# Patient Record
Sex: FEMALE | Race: BLACK OR AFRICAN AMERICAN | Employment: FULL TIME | URBAN - METROPOLITAN AREA
[De-identification: names, ages, dates, MRNs, and addresses within clinical notes are randomized per-mention and may not be internally consistent; named-entity substitution may affect disease eponyms.]

---

## 2020-06-24 ENCOUNTER — HOSPITAL ENCOUNTER (EMERGENCY)
Age: 25
Discharge: HOME | End: 2020-06-24
Attending: EMERGENCY MEDICINE
Payer: MEDICAID

## 2020-06-24 VITALS
WEIGHT: 127.65 LBS | HEIGHT: 64 IN | DIASTOLIC BLOOD PRESSURE: 63 MMHG | RESPIRATION RATE: 16 BRPM | TEMPERATURE: 100.3 F | SYSTOLIC BLOOD PRESSURE: 108 MMHG | HEART RATE: 104 BPM | OXYGEN SATURATION: 100 % | BODY MASS INDEX: 21.79 KG/M2

## 2020-06-24 DIAGNOSIS — J06.9 ACUTE URI: ICD-10-CM

## 2020-06-24 DIAGNOSIS — Z20.822 ENCOUNTER FOR LABORATORY TESTING FOR COVID-19 VIRUS: Primary | ICD-10-CM

## 2020-06-24 PROCEDURE — 99283 EMERGENCY DEPT VISIT LOW MDM: CPT

## 2020-06-24 PROCEDURE — 87635 SARS-COV-2 COVID-19 AMP PRB: CPT

## 2020-06-24 NOTE — LETTER
[address] 
[facility] EMERGENCY DEPT 
[address] [facility] [id number] [phone] Work/School Note Date: 6/24/2020 To Whom It May concern: 
 
Felipe Arzola was seen and treated today in the emergency room by the following provider(s): 
Attending Provider: Noel Curran MD 
Physician Assistant: Ros Moreira. In light of the current COVID-19 pandemic, please excuse your employee from work under the circumstance below: 
 
1) If patient was exposed but without symptoms, he/she should self-isolate at home for 14 days from day of exposure. 2) If patient has symptoms concerning for COVID-19, such as fever, cough, shortness of breath, regardless if patient received testing or not, patient should self-isolate at home until 3 days after symptoms have resolved AND 7 days after symptoms first started, whichever is later. Thank you. Sincerely, Ros Miramontes Sincerely, Ros Miramontes

## 2020-06-24 NOTE — DISCHARGE INSTRUCTIONS
Prevention steps for patients with confirmed or suspected COVID-19 who do not need to be hospitalized    Timing for Self-Isolation    If you have been exposed but do not have symptoms:  If you suspect you have been exposed to someone with COVID-19 and don't have any symptoms, you should self-isolate at home for 14 days from the time of exposure. If you have symptoms whether or not you have been tested: If you have symptoms suggestive of COVID-19, such as fever or cough, regardless of whether you have been tested, you should self-isolate at home until 72 hours (3 days) after your symptoms have completely resolved AND 7 days after your symptoms first started, whichever is later. How to Properly Self-Isolate Due to COVID-19    Stay home except to get medical care  People who are mildly ill with COVID-19 are able to isolate at home during their illness. You should restrict activities outside your home, except for getting medical care. Do not go to work, school, or public areas. Avoid using public transportation, ride-sharing, or taxis. If you or a loved one must go out, please make sure to wash hands properly immediately on returning home. Disinfect touched surfaces. Do not touch your face. Separate yourself from other people and animals in your home  People: As much as possible, you should stay in a specific room and away from other people in your home. Also, you should use a separate bathroom, if available. Animals: You should restrict contact with pets and other animals while you are sick with COVID-19, just like you would around other people. Although there have not been reports of pets or other animals becoming sick with COVID-19, it is still recommended that people sick with COVID-19 limit contact with animals until more information is known about the virus. When possible, have another member of your household care for your animals while you are sick.  If you are sick with COVID-19, avoid contact with your pet, including petting, snuggling, being kissed or licked, and sharing food. If you must care for your pet or be around animals while you are sick, wash your hands before and after you interact with pets and wear a facemask. Call ahead before visiting your doctor  If you have a medical appointment, call the healthcare provider and tell them that you have or may have COVID-19. This will help the healthcare providers office take steps to keep other people from getting infected or exposed. Wear a facemask  You should wear a facemask when you are around other people (e.g., sharing a room or vehicle) or pets and before you enter a healthcare providers office. If you are not able to wear a facemask (for example, because it causes trouble breathing), then people who live with you should not stay in the same room with you, or they should wear a facemask if they enter your room. Cover your coughs and sneezes  Cover your mouth and nose with a tissue when you cough or sneeze. Throw used tissues in a lined trash can. Immediately wash your hands with soap and water for at least 20 seconds or, if soap and water are not available, clean your hands with an alcohol-based hand  that contains at least 60% alcohol. Clean your hands often  Wash your hands often with soap and water for at least 20 seconds, especially after blowing your nose, coughing, or sneezing; going to the bathroom; and before eating or preparing food. If soap and water are not readily available, use an alcohol-based hand  with at least 60% alcohol, covering all surfaces of your hands and rubbing them together until they feel dry. Soap and water are the best option if hands are visibly dirty. Avoid touching your eyes, nose, and mouth with unwashed hands. Avoid sharing personal household items  You should not share dishes, drinking glasses, cups, eating utensils, towels, or bedding with other people or pets in your home.  After using these items, they should be washed thoroughly with soap and water. Clean all high-touch surfaces everyday  High touch surfaces include counters, tabletops, doorknobs, bathroom fixtures, toilets, phones, keyboards, tablets, and bedside tables. Also, clean any surfaces that may have blood, stool, or body fluids on them. Use a household cleaning spray or wipe, according to the label instructions. Labels contain instructions for safe and effective use of the cleaning product including precautions you should take when applying the product, such as wearing gloves and making sure you have good ventilation during use of the product. Monitor your symptoms  Seek prompt medical attention if your illness is worsening (e.g., difficulty breathing). Before seeking care, call your healthcare provider and tell them that you have, or are being evaluated for, COVID-19. Put on a facemask before you enter the facility. These steps will help the healthcare providers office to keep other people in the office or waiting room from getting infected or exposed. Ask your healthcare provider to call the local or Critical access hospital health department. Persons who are placed under active monitoring or facilitated self-monitoring should follow instructions provided by their local health department or occupational health professionals, as appropriate. When working with your local health department check their available hours. If you have a medical emergency and need to call 911, notify the dispatch personnel that you have, or are being evaluated for COVID-19. If possible, put on a facemask before emergency medical services arrive. Discontinuing home isolation  Patients with confirmed COVID-19 should remain under home isolation precautions until the risk of secondary transmission to others is thought to be low based on the above CDC recommendations.  The decision to discontinue home isolation precautions should be made on a case-by-case basis, in consultation with healthcare providers and state and local health departments. Oupatient testing  You can now get tested on an outpatient basis if you are showing symptoms of Covid19 at one of the [facility] or [facility] by appointment only. BETTER MED:  [email or web address] to make an appointment. [facility]IENT FIRST: Luis Alberto to make an appointment. This service is currently offered at the [facility] and [facility]. To make an appointment, call your preferred Center and enter 5 during the recording to speak with the [place]. All [facility] Centers, including the [facility], remain Open Every Day for Walk-in care of Illness and Injury. Who can be tested? In order to make an appointment to be tested, you must meet screening criteria, which are based on CDC guidance. The screening criteria include either of the following conditions:   You have a symptom or symptoms of COVID-19 (fever, coughing, shortness of breath, sore throat).  You are a healthcare worker or . What is the cost?  For insured patients, there is no out-of-pocket expense. The visit will be submitted to patients' insurance. [facility] accepts all major insurance plans, including Medicare and Medicaid. For self-pay patients, the cost is $90 for the exam plus a separate bill from the lab, which is $51.31 in [place]. What is the process for being tested? First, make an appointment by calling your local Designated Patient Corina Cisneros. Please have your insurance card on hand when you call. Bring your insurance card and picture ID with you to your appointment. Upon arrival, follow the Coronavirus Testing signs and park in a designated testing parking spot.  A staff member will meet you at your car to verify your information, complete your registration, check vitals, and take a sample for testing. The sample will be sent to a reference lab for testing. Self-quarantine until you receive your results. A nurse will call you once your results are available, and will provide you with guidance and answer any questions you may have. Further resources and [address]  Please visit the CDC website for more information. RetailCleaners.fi. [place] residents with questions about COVID-19 can call the [address] at [place].

## 2020-06-24 NOTE — ED PROVIDER NOTES
EMERGENCY DEPARTMENT HISTORY AND PHYSICAL EXAM      Date: 6/24/2020  Patient Name: Anirudh Lora    History of Presenting Illness     Chief Complaint   Patient presents with    Covid Testing     HA sore throat, watery eyes, dry cough and general body aches since last night. Pt reports she was drinking alot last night but does not feel like this is from that. History Provided By: Patient    HPI: Anirudh Lora, 22 y.o. female without PMHx significant, presents by POV to the ED with cc of URI symptoms. Her complaint started yesterday. She notes a sore throat, headache, watery eyes, dry cough, myalgias, generalized fatigue. She denies documented fevers but states that she has felt warm. She denies chills, shortness of breath, chest pain, otalgia. She denies being around any known sick contacts. However, she notes that her birthday just occurred and she has been out with friends drinking over the last several days since this weekend. Occupation: Works for a [employer]    Travel: There has been no recent international or domestic travel. There are no other complaints, changes, or physical findings at this time. Social Hx: Tobacco (denies), EtOH (social), Illicit drug use (denies)     PCP: None    No current facility-administered medications on file prior to encounter. Current Outpatient Medications on File Prior to Encounter   Medication Sig Dispense Refill    nitrofurantoin, macrocrystal-monohydrate, (MACROBID) 100 mg capsule Take one by mouth after intercourse 40 Cap 3       Past History     Past Medical History:  Past Medical History:   Diagnosis Date    UTI (urinary tract infection)        Past Surgical History:  No past surgical history on file. Family History:  No family history on file.     Social History:  Social History     Tobacco Use    Smoking status: Never Smoker   Substance Use Topics    Alcohol use: Not on file    Drug use: Not on file Allergies:  No Known Allergies      Review of Systems   Review of Systems   Constitutional: Positive for fatigue and fever. Negative for chills and diaphoresis. HENT: Positive for congestion, rhinorrhea and sore throat. Negative for ear pain. Respiratory: Positive for cough. Negative for shortness of breath. Cardiovascular: Negative for chest pain. Gastrointestinal: Negative for abdominal pain, constipation, diarrhea, nausea and vomiting. Genitourinary: Negative for difficulty urinating, dysuria, frequency and hematuria. Musculoskeletal: Positive for myalgias. Negative for arthralgias. Neurological: Positive for light-headedness and headaches. All other systems reviewed and are negative. Physical Exam   Physical Exam  Vitals signs and nursing note reviewed. Constitutional:       General: She is not in acute distress. Appearance: She is well-developed. She is not diaphoretic. Comments: 22 y. o. -American female    HENT:      Head: Normocephalic and atraumatic. Eyes:      General:         Right eye: No discharge. Left eye: No discharge. Conjunctiva/sclera: Conjunctivae normal.   Neck:      Musculoskeletal: Normal range of motion and neck supple. Cardiovascular:      Rate and Rhythm: Normal rate and regular rhythm. Heart sounds: Normal heart sounds. No murmur. Pulmonary:      Effort: Pulmonary effort is normal. No respiratory distress. Breath sounds: Normal breath sounds. Skin:     General: Skin is warm and dry. Neurological:      Mental Status: She is alert and oriented to person, place, and time. Psychiatric:         Behavior: Behavior normal.         Diagnostic Study Results     Labs - COVID-19 testing pending at discharge. Results will be followed by [address] Radiologic Studies - None    Medical Decision Making   I am the first provider for this patient.     I reviewed the vital signs, available nursing notes, past medical history, past surgical history, family history and social history. Vital Signs-Reviewed the patient's vital signs. Patient Vitals for the past 12 hrs:   Temp Pulse Resp BP SpO2   06/24/20 1657 100.3 °F (37.9 °C) (!) 104 16 108/63 100 %       Records Reviewed: Nursing Notes    Provider Notes (Medical Decision Making): The evaluation, management, and disposition decisions of this patient have been made in the context of the current and rapidly developing COVID-19 pandemic. In my clinical judgment, the balance of clinical factors dictate expedited evaluation and discharge from the ED. I have carefully considered the risk and benefits of prolonged ED workups and/or hospitalization vs their risk of acquiring or transmitting COVID-19. I have made reasonable efforts to conserve healthcare resources and defer to safe outpatient alternatives when feasible. I have also discussed the importance of social distancing and proper hygiene to the patient. Based on an appropriate medical screening exam, there is currently no evidence of an emergency medical condition in the patient, and she is clinically safe for discharge. This was a collective decision made with the patient and/or any available family/caretakers. They expressed understanding and agreement with the above. ED Course:   Initial assessment performed. The patients presenting problems have been discussed, and they are in agreement with the care plan formulated and outlined with them. I have encouraged them to ask questions as they arise throughout their visit. Critical Care Time: None    Disposition:  DISCHARGE NOTE:  6:39 PM  The pt is ready for discharge. The pt's signs, symptoms, diagnosis, and discharge instructions have been discussed and pt has conveyed their understanding. The pt is to follow up as recommended or return to ER should their symptoms worsen. Plan has been discussed and pt is in agreement. PLAN:  1.    Current Discharge Medication List        2. Follow-up Information     Follow up With Specialties Details Why Contact Info    [facility] EMERGENCY DEPT Emergency Medicine  If symptoms worsen [address]  [address] [facility]  [phone]    Your PCP  In 1 week As needed         3. COVID Testing results will be called once available  4. Take Tylenol as needed; Avoid NSAIDs  5. Drink plenty of fluids  6. It is advised to lay prone for 3 hours daily  Return to ED if worse     Diagnosis     Clinical Impression:   1. Encounter for laboratory testing for COVID-19 virus    2. Acute URI          Please note that this dictation was completed with [employer], the [employer] voice recognition software. Quite often unanticipated grammatical, syntax, homophones, and other interpretive errors are inadvertently transcribed by the computer software. Please disregards these errors. Please excuse any errors that have escaped final proofreading. This note will not be viewable in [address]

## 2020-06-25 ENCOUNTER — PATIENT OUTREACH (OUTPATIENT)
Dept: FAMILY MEDICINE CLINIC | Age: 25
End: 2020-06-25

## 2020-06-25 ENCOUNTER — TELEPHONE (OUTPATIENT)
Dept: FAMILY MEDICINE CLINIC | Age: 25
End: 2020-06-25

## 2020-06-25 NOTE — TELEPHONE ENCOUNTER
Left message for patient to call back and scheduled VV due to pending covid results. No in office visit due to symptoms.

## 2020-06-25 NOTE — TELEPHONE ENCOUNTER
----- Message from Jessica Sosa sent at 6/25/2020 10:45 AM EDT -----  Regarding: Dombereket/telephone  New Pt is requesting a hospital f/up appointment she went to [facility] ER on 6/24/20 for a fever and coughing she was tested for covid19 and she has not gotten the results. Pts number is [phone].

## 2020-06-25 NOTE — TELEPHONE ENCOUNTER
----- Message from Shania Monzon sent at 6/25/2020 10:45 AM EDT -----  Regarding: Juli/telephone  New Pt is requesting a hospital f/up appointment she went [facility] on 6/24/20 for a fever and coughing she was tested for covid19 and she has not gotten the results. Pts number is [phone].

## 2020-06-25 NOTE — PROGRESS NOTES
Patient contacted regarding COVID-19  risk. Discussed COVID-19 related testing which was pending at this time. Test results were pending. Patient informed of results, if available? yes     Care Transition Nurse/ Ambulatory Care Manager contacted the patient by telephone to perform post discharge assessment. Verified name and  with patient as identifiers. Provided introduction to self, and explanation of the CTN/ACM role, and reason for call due to risk factors for infection and/or exposure to COVID-19. Symptoms reviewed with patient who verbalized the following symptoms: fever, fatigue and diarrhea. Due to no new or worsening symptoms encounter was not routed to provider for escalation. Discussed follow-up appointments. If no appointment was previously scheduled, appointment scheduling offered: yes  [facility] follow up appointment(s): No future appointments. scheduled through [facility]  [facility] follow [facility] appointment(s): none at this time     Patient has following risk factors of: none reported at this time. CTN/ACM reviewed discharge instructions, medical action plan and red flags such as increased shortness of breath, increasing fever and signs of decompensation with patient who verbalized understanding. Discussed exposure protocols and quarantine with CDC Guidelines What to do if you are sick with coronavirus disease 2019.  Patient was given an opportunity for questions and concerns. The patient agrees to contact the [facility] exposure line [phone], [facility]  ([phone]) and PCP office for questions related to their healthcare. CTN/ACM provided contact information for future needs. Reviewed and educated patient on any new and changed medications related to discharge diagnosis. Patient/family/caregiver given information for [facility] and agrees to enroll yes  Patient's preferred e-mail:  [email or web address]  Patient's preferred phone number: [phone]  Based on Loop alert triggers, patient will be contacted by nurse care manager for worsening symptoms. Pt will be further monitored by COVID Loop Team based on severity of symptoms and risk factors.  DMB

## 2020-06-26 ENCOUNTER — TELEPHONE (OUTPATIENT)
Dept: FAMILY MEDICINE CLINIC | Age: 25
End: 2020-06-26

## 2020-06-26 LAB
SARS-COV-2, COV2NT: DETECTED
SOURCE, COVRS: ABNORMAL
SPECIMEN SOURCE, FCOV2M: ABNORMAL

## 2020-06-26 NOTE — TELEPHONE ENCOUNTER
----- Message from Ed Segura sent at 6/25/2020  8:13 PM EDT -----  Regarding: Dr Bustos/Telephone  General Message/Vendor Calls    Caller's first and last name:      Reason for call: Pt was seen and tested for symptoms related to COVID-19 (no results yet) at the ED at [facility] on 06/24/20 and was referred to schedule an appt to follow up.  Pt is requesting a face to face new pt appt and not a virtual appt to be seen to be tested if there is any other diagnosis other than COVID-19      Callback required yes/no and why: yes    Best contact number(s): [phone]        Details to clarify the request:      Ed Segura

## 2020-06-26 NOTE — PROGRESS NOTES
The patient was called for notification of a POSITIVE test result for COVID-19. The following information was given to the patient:    The COVID-19 test result was positive  Mild and stable symptoms are managed at home    Treatment of coronavirus does not require an antibiotic  Remain isolated for 14 days since symptoms first appeared AND at least 3 days have passed after recovery   Recovery is defined as resolution of fever without the use of fever-reducing medications with progressive improvement or resolution of other symptoms    Wash hands often with soap and water for at least 20 seconds or alternatively use hand  with at least 60% alcohol content  Cover coughs and sneezes  Wear a mask when around others if possible  Clean all high-touch surfaces every day, such as doorknobs and cellphones  Continually monitor symptoms.  Contact your medical provider if symptoms are worsening, such as difficulty breathing  For more information visit the CDC website: DotProtection.gl

## 2020-06-26 NOTE — TELEPHONE ENCOUNTER
Verified patient with two types of identifiers. Advised patient that due to her symptoms and pending covid results we are not able to bring her into the office. However we can do a VV. Scheduled for a VV.

## 2020-07-01 ENCOUNTER — VIRTUAL VISIT (OUTPATIENT)
Dept: FAMILY MEDICINE CLINIC | Age: 25
End: 2020-07-01

## 2020-07-01 DIAGNOSIS — U07.1 REAL TIME REVERSE TRANSCRIPTASE PCR POSITIVE FOR COVID-19 VIRUS: Primary | ICD-10-CM

## 2020-07-01 DIAGNOSIS — N39.0 RECURRENT UTI (URINARY TRACT INFECTION): ICD-10-CM

## 2020-07-01 RX ORDER — NITROFURANTOIN 25; 75 MG/1; MG/1
CAPSULE ORAL
Qty: 40 CAP | Refills: 3 | Status: SHIPPED | OUTPATIENT
Start: 2020-07-01 | End: 2020-11-02 | Stop reason: ALTCHOICE

## 2020-07-01 NOTE — PROGRESS NOTES
Chief Complaint   Patient presents with    New Patient     was tested positive on 6/24/2020      HPI:  Jolynn Warren is a 22 y.o. female who was seen by synchronous (real-time) audio-video technology on 7/1/2020 for New Patient (was tested positive on 6/24/2020 )    Assessment & Plan:   Diagnoses and all orders for this visit:    1. Real time reverse transcriptase PCR positive for COVID-19 virus    2. Recurrent UTI (urinary tract infection)  -     nitrofurantoin, macrocrystal-monohydrate, (Macrobid) 100 mg capsule; Take one by mouth after intercourse        I spent at least 25 minutes on this visit with this new patient. 712  Subjective:   Jolynn Warren is a 22 y. o. AA female was seen in ER 6/24/20 with c/o sore throat, headache, watery eyes, dry cough, myalgias, generalized fatigue. Covid test is positive. She has been in quarantine since and is advised to stay for total of 14 days. After which we will repeat COVD test.  Today, she says symptoms are resolving. Prior to Admission medications    Medication Sig Start Date End Date Taking? Authorizing Provider   nitrofurantoin, macrocrystal-monohydrate, (MACROBID) 100 mg capsule Take one by mouth after intercourse 5/5/16 7/1/20  Kendy Olsen MD     There is no problem list on file for this patient. Current Outpatient Medications   Medication Sig Dispense Refill    nitrofurantoin, macrocrystal-monohydrate, (Macrobid) 100 mg capsule Take one by mouth after intercourse 40 Cap 3     No Known Allergies  Past Medical History:   Diagnosis Date    UTI (urinary tract infection)      History reviewed. No pertinent surgical history. No family history on file.   Social History     Tobacco Use    Smoking status: Never Smoker    Smokeless tobacco: Never Used   Substance Use Topics    Alcohol use: Yes     Comment: socially     ROS  As per hpi    Objective:     Patient-Reported Vitals 6/30/2020   Patient-Reported Weight 124lbs   Patient-Reported Height 53 3/4 Patient-Reported Pulse 68 beats per minute   Patient-Reported Temperature No fever   Patient-Reported SpO2 N/A   Patient-Reported Peak Flow N/A   Patient-Reported LMP 06/09/2020 - 06/13/2020      General: alert, cooperative, no distress   Mental  status: normal mood, behavior, speech, dress, motor activity, and thought processes, able to follow commands   HENT: NCAT   Neck: no visualized mass   Resp: no respiratory distress   Neuro: no gross deficits   Skin: no discoloration or lesions of concern on visible areas   Psychiatric: normal affect, consistent with stated mood, no evidence of hallucinations     Additional exam findings: We discussed the expected course, resolution and complications of the diagnosis(es) in detail. Medication risks, benefits, costs, interactions, and alternatives were discussed as indicated. I advised her to contact the office if her condition worsens, changes or fails to improve as anticipated. She expressed understanding with the diagnosis(es) and plan. Carlee Garcia, who was evaluated through a patient-initiated, synchronous (real-time) audio-video encounter, and/or her healthcare decision maker, is aware that it is a billable service, with coverage as determined by her insurance carrier. She provided verbal consent to proceed: Yes, and patient identification was verified. It was conducted pursuant to the emergency declaration under the [address] [place] [address] authority and the [employer] General Act. A caregiver was present when appropriate. Ability to conduct physical exam was limited. I was in the office. The patient was at home.       Antony Deleon MD

## 2020-07-02 ENCOUNTER — TELEPHONE (OUTPATIENT)
Dept: FAMILY MEDICINE CLINIC | Age: 25
End: 2020-07-02

## 2020-07-02 NOTE — TELEPHONE ENCOUNTER
----- Message from Noemi Tinajero sent at 7/2/2020  8:56 AM EDT -----  Regarding: DR Bustos/telephone  Contact: [phone]  General Message/Vendor Calls    Caller's first and last name:Randee Perez      Reason for call:Update pharmacy information to [facility] pharmacy at [address]. Phone number [phone]      Callback required yes/no and why:no      Best contact number(s):[phone]      Details to clarify the request:      Noemi Tinajero

## 2020-10-06 ENCOUNTER — TELEPHONE (OUTPATIENT)
Dept: FAMILY MEDICINE CLINIC | Age: 25
End: 2020-10-06

## 2020-10-06 NOTE — TELEPHONE ENCOUNTER
Left message advising her that she can schedule an in office visit so long as she is symptom free for 14 days. She can schedule with PSR for in office visit for the next available appt.

## 2020-10-06 NOTE — TELEPHONE ENCOUNTER
----- Message from Anson Anne sent at 10/6/2020  8:09 AM EDT -----  Regarding: Dr. Leon Adams first and last name: pt  Reason for call: Procedures  Callback required yes/no and why: Yes, question  Best contact number(s): [phone]  Details to clarify the request: pap smear, hpv vaccine, DTaP/tdap/td all need to be done as well as f/ups for COVID and labwork, can she do all of it in one appt?

## 2020-10-22 ENCOUNTER — TELEPHONE (OUTPATIENT)
Dept: FAMILY MEDICINE CLINIC | Age: 25
End: 2020-10-22

## 2020-10-22 ENCOUNTER — VIRTUAL VISIT (OUTPATIENT)
Dept: FAMILY MEDICINE CLINIC | Age: 25
End: 2020-10-22
Payer: MEDICAID

## 2020-10-22 DIAGNOSIS — D50.0 IRON DEFICIENCY ANEMIA DUE TO CHRONIC BLOOD LOSS: ICD-10-CM

## 2020-10-22 DIAGNOSIS — E55.9 VITAMIN D DEFICIENCY: ICD-10-CM

## 2020-10-22 DIAGNOSIS — E78.5 DYSLIPIDEMIA (HIGH LDL; LOW HDL): Primary | ICD-10-CM

## 2020-10-22 DIAGNOSIS — Z13.29 SCREENING FOR THYROID DISORDER: ICD-10-CM

## 2020-10-22 DIAGNOSIS — R35.0 FREQUENCY OF URINATION: ICD-10-CM

## 2020-10-22 DIAGNOSIS — Z79.899 ENCOUNTER FOR LONG-TERM (CURRENT) USE OF MEDICATIONS: ICD-10-CM

## 2020-10-22 DIAGNOSIS — R73.03 BORDERLINE DIABETES MELLITUS: ICD-10-CM

## 2020-10-22 DIAGNOSIS — Z20.822 LAB TEST NEGATIVE FOR COVID-19 VIRUS: ICD-10-CM

## 2020-10-22 PROCEDURE — 99214 OFFICE O/P EST MOD 30 MIN: CPT | Performed by: INTERNAL MEDICINE

## 2020-10-22 NOTE — TELEPHONE ENCOUNTER
----- Message from Sathya Villalpando sent at 10/21/2020  4:50 PM EDT -----  Regarding: DR TILELY \[facility] [place] [facility]\"" / Brandon Roman Message/Vendor Calls    APPT SCHEDULED  for employment commission  documentation completing and review.   Pt is requesting to speak with nurse to discuss getting documents to the office via [phone] required     Best contact number(s): [phone]          Sathya Villalpando

## 2020-10-22 NOTE — PROGRESS NOTES
Chief Complaint   Patient presents with    Follow-up     HPI:  Rakel Cowan is a 22 y.o. female who was seen by synchronous (real-time) audio-video technology on 10/22/2020 for No chief complaint on file. Assessment & Plan:   Diagnoses and all orders for this visit:    1. Dyslipidemia (high LDL; low HDL)  -     LIPID PANEL    2. Vitamin D deficiency  -     VITAMIN D, 25 HYDROXY    3. Frequency of urination  -     URINALYSIS W/ RFLX MICROSCOPIC    4. Screening for thyroid disorder  -     TSH 3RD GENERATION    5. Borderline diabetes mellitus  -     HEMOGLOBIN A1C WITH EAG    6. Encounter for long-term (current) use of medications  -     METABOLIC PANEL, COMPREHENSIVE    7. Iron deficiency anemia due to chronic blood loss  -     CBC W/O DIFF    8. Lab test negative for COVID-19 virus      I spent at least 20 minutes on this visit with this established patient. 712  Subjective:   Rakel Cowan is a 22 y. o. AA female with vit d def, yslipidemia who is seen for follow up after COVID infection. Her repeat COVID test is negative. She is requesting a clearance letter and also a FMLA form completion for her job. Prior to Admission medications    Medication Sig Start Date End Date Taking? Authorizing Provider   nitrofurantoin, macrocrystal-monohydrate, (Macrobid) 100 mg capsule Take one by mouth after intercourse 7/1/20  Yes Tricia Goetz MD     Current Outpatient Medications   Medication Sig Dispense Refill    nitrofurantoin, macrocrystal-monohydrate, (Macrobid) 100 mg capsule Take one by mouth after intercourse 40 Cap 3     No Known Allergies     Past Medical History:   Diagnosis Date    UTI (urinary tract infection)      No past surgical history on file. No family history on file.   Social History     Tobacco Use    Smoking status: Never Smoker    Smokeless tobacco: Never Used   Substance Use Topics    Alcohol use: Yes     Comment: socially     ROS  As per hpi    Objective:     Patient-Reported Vitals 10/22/2020 Patient-Reported Weight -   Patient-Reported Height -   Patient-Reported Pulse -   Patient-Reported Temperature -   Patient-Reported SpO2 -   Patient-Reported Peak Flow -   Patient-Reported LMP 10/22/2020      General: alert, cooperative, no distress   Mental  status: normal mood, behavior, speech, dress, motor activity, and thought processes, able to follow commands   HENT: NCAT   Neck: no visualized mass   Resp: no respiratory distress   Neuro: no gross deficits   Skin: no discoloration or lesions of concern on visible areas     Additional exam findings: We discussed the expected course, resolution and complications of the diagnosis(es) in detail. Medication risks, benefits, costs, interactions, and alternatives were discussed as indicated. I advised her to contact the office if her condition worsens, changes or fails to improve as anticipated. She expressed understanding with the diagnosis(es) and plan. Nelli Mcleod, who was evaluated through a patient-initiated, synchronous (real-time) audio-video encounter, and/or her healthcare decision maker, is aware that it is a billable service, with coverage as determined by her insurance carrier. She provided verbal consent to proceed: Yes, and patient identification was verified. It was conducted pursuant to the emergency declaration under the [address] [place] [address], [address] [place] [address] authority and the [employer] General Act. A caregiver was present when appropriate. Ability to conduct physical exam was limited. I was in the office. The patient was at home.       Eduardo Mensah MD

## 2020-10-22 NOTE — LETTER
10/23/2020 9:20 AM 
 
Ms. Sarai Lorenzo [address] [address] After Visit Summary Sarai Lorenzo \"Ever\"  : [birth year] OBJ: [id number]  
 10/22/2020  3:40 PM   Ellis Davidalaina at [address] [place] [address] Today's Visit You saw Odalys Freeman MD on . The following issues were addressed:  
0 Vitamin D deficiency 0 Dyslipidemia (high LDL; low HDL) 0 Frequency of urination  
0 Screening for thyroid disorder  
0 Borderline diabetes mellitus  
0 Encounter for long-term (current) use of medications 0 Iron deficiency anemia due to chronic blood loss  
0 Lab test negative for COVID-19 virus Done Today METABOLIC PANEL, COMPREHENSIVE for Encounter for long-term (current) use of medications CBC W/O DIFF for Iron deficiency anemia due to chronic blood loss LIPID PANEL for Dyslipidemia (high LDL; low HDL) HEMOGLOBIN A1C WITH EAG for Borderline diabetes mellitus TSH 3RD GENERATION for Screening for thyroid disorder VITAMIN D, 25 HYDROXY for Vitamin D deficiency URINALYSIS W/ RFLX MICROSCOPIC for Frequency of urination After Visit Summary Instructions  
from Odalys Freeman MD  
  
Return in about 3 months  
 
(around 2021), or if symptoms worsen or fail to improve, for routine follow up. Today's Visit Done Today What's Next What's Next [id number] Any with Odalys Freeman MD  
 3:40 PM  Ellis Lynn at [address] [place] [address] [facility]  
[phone] Reason for Visit Follow-up Current Immunizations Never Reviewed No immunizations on file. Not reviewed this visit Upcoming Health Maintenance     
Full History HPV Age 9Y-34Y (1 - 2-dose series)   Overdue since 2006 DTaP/Tdap/Td series (1 - Tdap)   Overdue since 2016 PAP AKA CERVICAL CYTOLOGY (Every 3 Years)   Overdue since 2016 Flu Vaccine (1)   Overdue since 2020 Dear Adama: Thank you for requesting a [employer] account. Our records indicate that you already have an active [employer] account. You can access your account anytime at [email or web address]  
  
Did you know that you can access your hospital and ER discharge instructions at any time in [employer]? You can also review all of your test results from your hospital stay or ER visit. 
  
Additional Information 
  
If you have questions, please visit the Frequently Asked Questions section of the [employer] website at [email or web address] Remember, [employer] is NOT to be used for urgent needs. For medical emergencies, dial 911. 
  
Now available from your iPhone and Android! Changes to Your Medication List  
 
(suggestion)   Accurate as of October 22, 2020 11:59 PM. If you have any questions, ask your nurse or doctor. CONTINUE taking these medications CONTINUE taking these medications  
nitrofurantoin (macrocrystal-monohydrate) 100 mg capsule Commonly known as: Macrobid  Take one by mouth after intercourse Sincerely, Arley Bedolla MD

## 2020-10-24 LAB
25(OH)D3+25(OH)D2 SERPL-MCNC: 26.7 NG/ML (ref 30–100)
ALBUMIN SERPL-MCNC: 4.3 G/DL (ref 3.9–5)
ALBUMIN/GLOB SERPL: 1.4 {RATIO} (ref 1.2–2.2)
ALP SERPL-CCNC: 62 IU/L (ref 39–117)
ALT SERPL-CCNC: 10 IU/L (ref 0–32)
APPEARANCE UR: CLEAR
AST SERPL-CCNC: 18 IU/L (ref 0–40)
BACTERIA #/AREA URNS HPF: ABNORMAL /[HPF]
BILIRUB SERPL-MCNC: 0.6 MG/DL (ref 0–1.2)
BILIRUB UR QL STRIP: NEGATIVE
BUN SERPL-MCNC: 16 MG/DL (ref 6–20)
BUN/CREAT SERPL: 16 (ref 9–23)
CALCIUM SERPL-MCNC: 9.2 MG/DL (ref 8.7–10.2)
CASTS URNS QL MICRO: ABNORMAL /LPF
CHLORIDE SERPL-SCNC: 102 MMOL/L (ref 96–106)
CHOLEST SERPL-MCNC: 147 MG/DL (ref 100–199)
CO2 SERPL-SCNC: 25 MMOL/L (ref 20–29)
COLOR UR: YELLOW
CREAT SERPL-MCNC: 0.98 MG/DL (ref 0.57–1)
EPI CELLS #/AREA URNS HPF: ABNORMAL /HPF (ref 0–10)
ERYTHROCYTE [DISTWIDTH] IN BLOOD BY AUTOMATED COUNT: 11.9 % (ref 11.7–15.4)
EST. AVERAGE GLUCOSE BLD GHB EST-MCNC: 100 MG/DL
GLOBULIN SER CALC-MCNC: 3 G/DL (ref 1.5–4.5)
GLUCOSE SERPL-MCNC: 78 MG/DL (ref 65–99)
GLUCOSE UR QL: NEGATIVE
HBA1C MFR BLD: 5.1 % (ref 4.8–5.6)
HCT VFR BLD AUTO: 37.1 % (ref 34–46.6)
HDLC SERPL-MCNC: 70 MG/DL
HGB BLD-MCNC: 12.5 G/DL (ref 11.1–15.9)
HGB UR QL STRIP: ABNORMAL
KETONES UR QL STRIP: NEGATIVE
LDLC SERPL CALC-MCNC: 61 MG/DL (ref 0–99)
LEUKOCYTE ESTERASE UR QL STRIP: NEGATIVE
MCH RBC QN AUTO: 29.5 PG (ref 26.6–33)
MCHC RBC AUTO-ENTMCNC: 33.7 G/DL (ref 31.5–35.7)
MCV RBC AUTO: 88 FL (ref 79–97)
MICRO URNS: ABNORMAL
MUCOUS THREADS URNS QL MICRO: PRESENT
NITRITE UR QL STRIP: NEGATIVE
PH UR STRIP: 6 [PH] (ref 5–7.5)
PLATELET # BLD AUTO: 237 X10E3/UL (ref 150–450)
POTASSIUM SERPL-SCNC: 4 MMOL/L (ref 3.5–5.2)
PROT SERPL-MCNC: 7.3 G/DL (ref 6–8.5)
PROT UR QL STRIP: NEGATIVE
RBC # BLD AUTO: 4.24 X10E6/UL (ref 3.77–5.28)
RBC #/AREA URNS HPF: ABNORMAL /HPF (ref 0–2)
SODIUM SERPL-SCNC: 140 MMOL/L (ref 134–144)
SP GR UR: 1.03 (ref 1–1.03)
TRIGL SERPL-MCNC: 88 MG/DL (ref 0–149)
TSH SERPL DL<=0.005 MIU/L-ACNC: 1.51 UIU/ML (ref 0.45–4.5)
UROBILINOGEN UR STRIP-MCNC: 1 MG/DL (ref 0.2–1)
VLDLC SERPL CALC-MCNC: 16 MG/DL (ref 5–40)
WBC # BLD AUTO: 7.6 X10E3/UL (ref 3.4–10.8)
WBC #/AREA URNS HPF: ABNORMAL /HPF (ref 0–5)

## 2020-10-27 NOTE — PROGRESS NOTES
Liver & kidney functions are normal.  CBC is normal  Cholesterol is normal  Vit d is slightly low, start taking vit d supplement  Urine shows 2+ blood

## 2020-11-02 ENCOUNTER — OFFICE VISIT (OUTPATIENT)
Dept: FAMILY MEDICINE CLINIC | Age: 25
End: 2020-11-02
Payer: MEDICAID

## 2020-11-02 ENCOUNTER — HOSPITAL ENCOUNTER (OUTPATIENT)
Dept: LAB | Age: 25
Discharge: HOME | End: 2020-11-02
Payer: MEDICAID

## 2020-11-02 VITALS
RESPIRATION RATE: 16 BRPM | HEART RATE: 76 BPM | BODY MASS INDEX: 22.73 KG/M2 | DIASTOLIC BLOOD PRESSURE: 60 MMHG | WEIGHT: 131.4 LBS | TEMPERATURE: 97.3 F | SYSTOLIC BLOOD PRESSURE: 107 MMHG | OXYGEN SATURATION: 99 %

## 2020-11-02 DIAGNOSIS — Z23 ENCOUNTER FOR IMMUNIZATION: ICD-10-CM

## 2020-11-02 DIAGNOSIS — R21 ACUTE ERUPTION OF SKIN: ICD-10-CM

## 2020-11-02 DIAGNOSIS — Z01.419 ENCOUNTER FOR GYNECOLOGICAL EXAMINATION: ICD-10-CM

## 2020-11-02 DIAGNOSIS — R06.02 SOB (SHORTNESS OF BREATH): ICD-10-CM

## 2020-11-02 DIAGNOSIS — E55.9 VITAMIN D DEFICIENCY: Primary | ICD-10-CM

## 2020-11-02 DIAGNOSIS — L73.8 FOLLICULITIS BARBAE: ICD-10-CM

## 2020-11-02 PROCEDURE — 99214 OFFICE O/P EST MOD 30 MIN: CPT | Performed by: INTERNAL MEDICINE

## 2020-11-02 PROCEDURE — 88175 CYTOPATH C/V AUTO FLUID REDO: CPT

## 2020-11-02 PROCEDURE — 87624 HPV HI-RISK TYP POOLED RSLT: CPT

## 2020-11-02 RX ORDER — ACETAMINOPHEN 500 MG
2000 TABLET ORAL DAILY
Qty: 30 CAP | Refills: 2 | Status: SHIPPED | OUTPATIENT
Start: 2020-11-02 | End: 2020-11-02 | Stop reason: SDUPTHER

## 2020-11-02 RX ORDER — AZITHROMYCIN 250 MG/1
TABLET, FILM COATED ORAL
Qty: 6 TAB | Refills: 0 | Status: SHIPPED | OUTPATIENT
Start: 2020-11-02 | End: 2020-11-02 | Stop reason: SDUPTHER

## 2020-11-02 RX ORDER — ACETAMINOPHEN 500 MG
2000 TABLET ORAL DAILY
Qty: 30 CAP | Refills: 2 | Status: SHIPPED | OUTPATIENT
Start: 2020-11-02 | End: 2022-08-19 | Stop reason: SDUPTHER

## 2020-11-02 RX ORDER — AZITHROMYCIN 250 MG/1
TABLET, FILM COATED ORAL
Qty: 6 TAB | Refills: 0 | Status: SHIPPED | OUTPATIENT
Start: 2020-11-02 | End: 2020-11-07

## 2020-11-02 RX ORDER — ALBUTEROL SULFATE 90 UG/1
1 AEROSOL, METERED RESPIRATORY (INHALATION)
Status: DISCONTINUED | OUTPATIENT
Start: 2020-11-02 | End: 2020-11-03

## 2020-11-02 NOTE — PROGRESS NOTES
Chief Complaint   Patient presents with    Follow-up     HPI:  Steve Pineda is a 22 y. o. AA female with no significant medical history presents for lab review and pap smear. Liver & kidney functions are normal. CBC is normal, Cholesterol is normal, Vit d is slightly low, Urine shows 2+ blood without infection  Vit D is in pharmacy. Diet and exercise are encouraged. Review of Systems  As per hpi    Past Medical History:   Diagnosis Date    UTI (urinary tract infection)      No past surgical history on file. Social History     Socioeconomic History    Marital status: SINGLE     Spouse name: Not on file    Number of children: Not on file    Years of education: Not on file    Highest education level: Not on file   Tobacco Use    Smoking status: Never Smoker    Smokeless tobacco: Never Used   Substance and Sexual Activity    Alcohol use: Yes     Comment: socially    Drug use: Never    Sexual activity: Yes     Partners: Female     Birth control/protection: None     No family history on file.   Current Outpatient Medications   Medication Sig Dispense Refill    nitrofurantoin, macrocrystal-monohydrate, (Macrobid) 100 mg capsule Take one by mouth after intercourse 40 Cap 3     No Known Allergies    Objective:  Visit Vitals  /60 (BP 1 Location: Right arm, BP Patient Position: Sitting)   Pulse 76   Temp 97.3 °F (36.3 °C) (Temporal)   Resp 16   Wt 131 lb 6.4 oz (59.6 kg)   SpO2 99%   BMI 22.73 kg/m²     Physical Exam:   General appearance - alert, well appearing in no distress  Mental status - alert, oriented to person, place, and time  EYE-PERRL, EOMI  Neck - supple, no significant adenopathy   Chest - clear to auscultation, no wheezes, rales or rhonchi  Heart - normal rate, regular rhythm, no murmurs  Abdomen - soft, nontender, nondistended, no organomegaly  Ext-peripheral pulses normal, no pedal edema  Neuro - no focal findings   Pelvic exam: normal external genitalia, vulva, vagina, cervix, uterus and adnexa, PAP: Pap smear done today. Results for orders placed or performed in visit on 02/26/20   METABOLIC PANEL, COMPREHENSIVE   Result Value Ref Range    Glucose 78 65 - 99 mg/dL    BUN 16 6 - 20 mg/dL    Creatinine 0.98 0.57 - 1.00 mg/dL    GFR est non-AA 80 >59 mL/min/1.73    GFR est AA 93 >59 mL/min/1.73    BUN/Creatinine ratio 16 9 - 23    Sodium 140 134 - 144 mmol/L    Potassium 4.0 3.5 - 5.2 mmol/L    Chloride 102 96 - 106 mmol/L    CO2 25 20 - 29 mmol/L    Calcium 9.2 8.7 - 10.2 mg/dL    Protein, total 7.3 6.0 - 8.5 g/dL    Albumin 4.3 3.9 - 5.0 g/dL    GLOBULIN, TOTAL 3.0 1.5 - 4.5 g/dL    A-G Ratio 1.4 1.2 - 2.2    Bilirubin, total 0.6 0.0 - 1.2 mg/dL    Alk.  phosphatase 62 39 - 117 IU/L    AST (SGOT) 18 0 - 40 IU/L    ALT (SGPT) 10 0 - 32 IU/L   CBC W/O DIFF   Result Value Ref Range    WBC 7.6 3.4 - 10.8 x10E3/uL    RBC 4.24 3.77 - 5.28 x10E6/uL    HGB 12.5 11.1 - 15.9 g/dL    HCT 37.1 34.0 - 46.6 %    MCV 88 79 - 97 fL    MCH 29.5 26.6 - 33.0 pg    MCHC 33.7 31.5 - 35.7 g/dL    RDW 11.9 11.7 - 15.4 %    PLATELET 332 197 - 805 x10E3/uL   LIPID PANEL   Result Value Ref Range    Cholesterol, total 147 100 - 199 mg/dL    Triglyceride 88 0 - 149 mg/dL    HDL Cholesterol 70 >39 mg/dL    VLDL Cholesterol Mehdi 16 5 - 40 mg/dL    LDL Chol Calc ([facility]) 61 0 - 99 mg/dL   HEMOGLOBIN A1C WITH EAG   Result Value Ref Range    Hemoglobin A1c 5.1 4.8 - 5.6 %    Estimated average glucose 100 mg/dL   TSH 3RD GENERATION   Result Value Ref Range    TSH 1.510 0.450 - 4.500 uIU/mL   VITAMIN D, 25 HYDROXY   Result Value Ref Range    VITAMIN D, 25-HYDROXY 26.7 (L) 30.0 - 100.0 ng/mL   URINALYSIS W/ RFLX MICROSCOPIC   Result Value Ref Range    Specific Gravity 1.028 1.005 - 1.030    pH (UA) 6.0 5.0 - 7.5    Color Yellow Yellow    Appearance Clear Clear    Leukocyte Esterase Negative Negative    Protein Negative Negative/Trace    Glucose Negative Negative    Ketone Negative Negative    Blood 2+ (A) Negative    Bilirubin Negative Negative    Urobilinogen 1.0 0.2 - 1.0 mg/dL    Nitrites Negative Negative    Microscopic Examination See additional order    MICROSCOPIC EXAMINATION   Result Value Ref Range    WBC 0-5 0 - 5 /hpf    RBC 11-30 (A) 0 - 2 /hpf    Epithelial cells 0-10 0 - 10 /hpf    Casts None seen None seen /lpf    Mucus Present Not Estab. Bacteria Few None seen/Few     Assessment/Plan:  Diagnoses and all orders for this visit:    Vitamin D deficiency  -     Discontinue: cholecalciferol (VITAMIN D3) (2,000 UNITS /50 MCG) cap capsule; Take 2,000 Units by mouth daily. , Normal, Disp-30 Cap,R-2  -     cholecalciferol (VITAMIN D3) (2,000 UNITS /50 MCG) cap capsule; Take 2,000 Units by mouth daily. , Normal, Disp-30 Cap,R-2    Encounter for immunization  -     Cancel: HUMAN PAPILLOMA VIRUS (HPV) VACCINE, TYPES 6, 11, 16, 18 (QUADRIVALENT), 3 DOSE SCHED., IM  -     Cancel: TETANUS, DIPHTHERIA TOXOIDS AND ACELLULAR PERTUSSIS VACCINE (TDAP), IN INDIVIDS. >=7, IM    Encounter for gynecological examination  -     PAP (IMAGE GUIDED) W/REFL HPV ALL PATHOLOGY ([id number]); Future    Folliculitis barbae  -     Discontinue: azithromycin (ZITHROMAX) 250 mg tablet; use as directed, Normal, Disp-6 Tab,R-0  -     azithromycin (ZITHROMAX) 250 mg tablet; use as directed, Normal, Disp-6 Tab,R-0    Acute eruption of skin  -     REFERRAL TO DERMATOLOGY  -     Discontinue: azithromycin (ZITHROMAX) 250 mg tablet; use as directed, Normal, Disp-6 Tab,R-0  -     azithromycin (ZITHROMAX) 250 mg tablet; use as directed, Normal, Disp-6 Tab,R-0    SOB (shortness of breath)  -     albuterol (PROVENTIL HFA, VENTOLIN HFA, PROAIR HFA) inhaler 1 Puff; 1 Puff, Inhalation, EVERY 4 HOURS RESP, First dose on Mon 11/2/20 at 2000, Until DiscontinuedMODE OF DELIVERY: Spacer      Patient Instructions          Folliculitis: Care Instructions  Overview     Folliculitis (say \"jwh-IZV-tfq-LY-tus\") is an inflammation of the pouches (follicles) in the skin where hair grows.  It can occur on any part of the body, but it is most common on the scalp, face, armpits, and groin. Bacteria, such as those found in a hot tub, can cause folliculitis. But folliculitis can also be caused by other organisms, such as fungi or parasites. Folliculitis begins as a red, tender area near a strand of hair. The skin can itch or burn and may drain pus or blood. Sometimes folliculitis can lead to more serious skin infections. Your doctor usually can treat mild folliculitis with an antibiotic cream or ointment. If you have folliculitis on your scalp, you may use a medicated shampoo. Antibiotics you take as pills can treat infections deeper in the skin. Other treatments that may be used include antifungal and antiparasitic medicines. Folliculitis may be caused by ingrown hairs from shaving. One solution is to stop shaving. If that isn't an option, using an electric razor that doesn't shave so close may help. Laser treatment may also be an option. Laser treatment destroys the hair follicle so hair will no longer grow in the treated area. Follow-up care is a key part of your treatment and safety. Be sure to make and go to all appointments, and call your doctor if you are having problems. It's also a good idea to know your test results and keep a list of the medicines you take. How can you care for yourself at home? · Take your medicine exactly as prescribed. If your doctor prescribed antibiotics, take them as directed. Do not stop taking them just because you feel better. You need to take the full course of antibiotics. · To help with itching or pain, put a warm, moist cloth (like a clean washcloth) on the area for 5 to 10 minutes, 3 to 6 times a day. · Do not share your razor, towel, or washcloth. That can spread folliculitis. · If folliculitis is caused by shaving, try to avoid shaving for at least a month. If that isn't an option, use an electric razor that doesn't shave so close.  Or if you need a clean shave, use shaving cream or gel and always shave in the direction that the hair grows. When should you call for help? Call your doctor now or seek immediate medical care if:    · You have symptoms of infection, such as:  ? Increased pain, swelling, warmth, or redness. ? Red streaks leading from the area. ? Pus draining from the area. ? A fever. Watch closely for changes in your health, and be sure to contact your doctor if:    · You do not get better as expected. Where can you learn more? Go to http://www.driscoll.com/  Enter M257 in the search box to learn more about \"Folliculitis: Care Instructions. \"  Current as of: July 2, 2020               Content Version: 12.6  © 2006-2020 [employer]. Care instructions adapted under license by [employer] (which disclaims liability or warranty for this information). If you have questions about a medical condition or this instruction, always ask your healthcare professional. [place] [address] any warranty or liability for your use of this information. Follow-up and Dispositions    · Return in about 6 months (around 5/2/2021), or if symptoms worsen or fail to improve, for routine follow up.

## 2020-11-02 NOTE — PROGRESS NOTES
Chief Complaint   Patient presents with   [address] [place] [address]     1. Have you been to the ER, urgent care clinic since your last visit? Hospitalized since your last visit? Yes When: 6/24 ED [facility] ER for flu like symptoms. Diagnosed witoscar andersenid, tested negative 7/24/20    2. Have you seen or consulted any other health care providers outside of the [address] [place] [address] since your last visit? Include any pap smears or colon screening.  No    Pt wants to discuss skin condition that causes ingrown hairs and scarring, vaginal discharge, and going on Albuterol

## 2020-11-02 NOTE — PATIENT INSTRUCTIONS
Folliculitis: Care Instructions  Overview     Folliculitis (say \"ewn-PLJ-cmw-LY-tus\") is an inflammation of the pouches (follicles) in the skin where hair grows. It can occur on any part of the body, but it is most common on the scalp, face, armpits, and groin. Bacteria, such as those found in a hot tub, can cause folliculitis. But folliculitis can also be caused by other organisms, such as fungi or parasites. Folliculitis begins as a red, tender area near a strand of hair. The skin can itch or burn and may drain pus or blood. Sometimes folliculitis can lead to more serious skin infections. Your doctor usually can treat mild folliculitis with an antibiotic cream or ointment. If you have folliculitis on your scalp, you may use a medicated shampoo. Antibiotics you take as pills can treat infections deeper in the skin. Other treatments that may be used include antifungal and antiparasitic medicines. Folliculitis may be caused by ingrown hairs from shaving. One solution is to stop shaving. If that isn't an option, using an electric razor that doesn't shave so close may help. Laser treatment may also be an option. Laser treatment destroys the hair follicle so hair will no longer grow in the treated area. Follow-up care is a key part of your treatment and safety. Be sure to make and go to all appointments, and call your doctor if you are having problems. It's also a good idea to know your test results and keep a list of the medicines you take. How can you care for yourself at home? · Take your medicine exactly as prescribed. If your doctor prescribed antibiotics, take them as directed. Do not stop taking them just because you feel better. You need to take the full course of antibiotics. · To help with itching or pain, put a warm, moist cloth (like a clean washcloth) on the area for 5 to 10 minutes, 3 to 6 times a day. · Do not share your razor, towel, or washcloth. That can spread folliculitis.   · If folliculitis is caused by shaving, try to avoid shaving for at least a month. If that isn't an option, use an electric razor that doesn't shave so close. Or if you need a clean shave, use shaving cream or gel and always shave in the direction that the hair grows. When should you call for help? Call your doctor now or seek immediate medical care if:    · You have symptoms of infection, such as:  ? Increased pain, swelling, warmth, or redness. ? Red streaks leading from the area. ? Pus draining from the area. ? A fever. Watch closely for changes in your health, and be sure to contact your doctor if:    · You do not get better as expected. Where can you learn more? Go to http://www.driscoll.com/  Enter M257 in the search box to learn more about \"Folliculitis: Care Instructions. \"  Current as of: July 2, 2020               Content Version: 12.6  © 2006-2020 [employer], [facility]. Care instructions adapted under license by [employer] (which disclaims liability or warranty for this information). If you have questions about a medical condition or this instruction, always ask your healthcare professional. [address] any warranty or liability for your use of this information.

## 2020-11-05 RX ORDER — ALBUTEROL SULFATE 90 UG/1
1 AEROSOL, METERED RESPIRATORY (INHALATION)
Qty: 1 INHALER | Refills: 2 | Status: SHIPPED | OUTPATIENT
Start: 2020-11-05 | End: 2020-11-10 | Stop reason: SDUPTHER

## 2020-12-01 DIAGNOSIS — R87.612 LOW GRADE SQUAMOUS INTRAEPITHELIAL LESION ON CYTOLOGIC SMEAR OF CERVIX (LGSIL): Primary | ICD-10-CM

## 2022-05-06 ENCOUNTER — VIRTUAL VISIT (OUTPATIENT)
Dept: FAMILY MEDICINE CLINIC | Age: 27
End: 2022-05-06
Payer: MEDICAID

## 2022-05-06 ENCOUNTER — NURSE TRIAGE (OUTPATIENT)
Dept: OTHER | Facility: CLINIC | Age: 27
End: 2022-05-06

## 2022-05-06 DIAGNOSIS — J20.9 ACUTE BRONCHITIS DUE TO INFECTION: Primary | ICD-10-CM

## 2022-05-06 PROCEDURE — 99213 OFFICE O/P EST LOW 20 MIN: CPT | Performed by: INTERNAL MEDICINE

## 2022-05-06 RX ORDER — PROMETHAZINE HYDROCHLORIDE AND DEXTROMETHORPHAN HYDROBROMIDE 6.25; 15 MG/5ML; MG/5ML
5 SYRUP ORAL
Qty: 118 ML | Refills: 0 | Status: SHIPPED | OUTPATIENT
Start: 2022-05-06 | End: 2022-05-13

## 2022-05-06 RX ORDER — SULFAMETHOXAZOLE AND TRIMETHOPRIM 800; 160 MG/1; MG/1
1 TABLET ORAL 2 TIMES DAILY
Qty: 14 TABLET | Refills: 0 | Status: SHIPPED | OUTPATIENT
Start: 2022-05-06 | End: 2022-05-13

## 2022-05-06 RX ORDER — ALBUTEROL SULFATE 90 UG/1
1 AEROSOL, METERED RESPIRATORY (INHALATION)
Qty: 18 G | Refills: 1 | Status: SHIPPED | OUTPATIENT
Start: 2022-05-06 | End: 2022-08-19 | Stop reason: SDUPTHER

## 2022-05-06 NOTE — PROGRESS NOTES
Chief Complaint   Patient presents with    Cough     taking OTC medication     Cold Symptoms     HPI:  Bennie Sawyer is a 32 y.o. female who was seen by synchronous (real-time) audio-video technology on 5/6/2022 for Cough (taking OTC medication ) and Cold Symptoms    Assessment & Plan:   Diagnoses and all orders for this visit:    1. Acute bronchitis due to infection  -     trimethoprim-sulfamethoxazole (BACTRIM DS, SEPTRA DS) 160-800 mg per tablet; Take 1 Tablet by mouth two (2) times a day for 7 days. -     promethazine-dextromethorphan (PROMETHAZINE-DM) 6.25-15 mg/5 mL syrup; Take 5 mL by mouth every four (4) hours as needed for Cough for up to 7 days. -     albuterol (PROVENTIL HFA, VENTOLIN HFA, PROAIR HFA) 90 mcg/actuation inhaler; Take 1 Puff by inhalation every four (4) hours as needed for Wheezing. I spent at least 20 minutes on this visit with this established patient. 712  Subjective:   Bennie Sawyer is a 32 y.o. AA female with h/o vit D def and asthma is seen for c/o productive cough, wheezing, no fever/chills, no shortness of breath. Symptoms have been present for 1 week. She has not been seen in clinic since 2020. Prior to Admission medications    Medication Sig Start Date End Date Taking? Authorizing Provider   albuterol (PROVENTIL HFA, VENTOLIN HFA, PROAIR HFA) 90 mcg/actuation inhaler Take 1 Puff by inhalation every four (4) hours as needed for Wheezing. 11/10/20  Yes Jason Soria MD   cholecalciferol (VITAMIN D3) (2,000 UNITS /50 MCG) cap capsule Take 2,000 Units by mouth daily. 11/2/20  Yes Jason Soria MD     There are no problems to display for this patient. Current Outpatient Medications   Medication Sig Dispense Refill    albuterol (PROVENTIL HFA, VENTOLIN HFA, PROAIR HFA) 90 mcg/actuation inhaler Take 1 Puff by inhalation every four (4) hours as needed for Wheezing. 1 Inhaler 2    cholecalciferol (VITAMIN D3) (2,000 UNITS /50 MCG) cap capsule Take 2,000 Units by mouth daily. 30 Cap 2     No Known Allergies  Past Medical History:   Diagnosis Date    UTI (urinary tract infection)      History reviewed. No pertinent surgical history. No family history on file. Social History     Tobacco Use    Smoking status: Never Smoker    Smokeless tobacco: Never Used   Substance Use Topics    Alcohol use: Yes     Comment: socially     ROS:  As per hpi    Objective:     Patient-Reported Vitals 10/22/2020   Patient-Reported Weight -   Patient-Reported Height -   Patient-Reported Pulse -   Patient-Reported Temperature -   Patient-Reported SpO2 -   Patient-Reported Peak Flow -   Patient-Reported LMP 10/22/2020      General: alert, cooperative, no distress   Mental  status: normal mood, behavior, speech, dress, motor activity, and thought processes, able to follow commands   HENT: NCAT   Neck: no visualized mass   Resp: no respiratory distress   Neuro: no gross deficits   Skin: no discoloration or lesions of concern on visible areas   Psychiatric: normal affect, consistent with stated mood, no evidence of hallucinations     Additional exam findings: We discussed the expected course, resolution and complications of the diagnosis(es) in detail. Medication risks, benefits, costs, interactions, and alternatives were discussed as indicated. I advised her to contact the office if her condition worsens, changes or fails to improve as anticipated. She expressed understanding with the diagnosis(es) and plan. Jim Whitfield, was evaluated through a synchronous (real-time) audio-video encounter. The patient (or guardian if applicable) is aware that this is a billable service, which includes applicable co-pays. Verbal consent to proceed has been obtained. The visit was conducted pursuant to the emergency declaration under the [address] [place] [address] authority and the [employer] Act.   Patient identification was verified, and a caregiver was present when appropriate. The patient was located at home in a state where the provider was licensed to provide care.     Alessandra Sharif MD

## 2022-05-06 NOTE — TELEPHONE ENCOUNTER
Received call from Sal John at [facility] with Red Flag Complaint. Subjective: Caller states \"difficulty breathing\"     Current Symptoms: Cough and congestion; Difficulty breathing due to congestion; Sore throat; History of asthma and using inhaler  Needs inhaler refill    Onset: 2 weeks ago; worsening    Associated Symptoms: increased wakefulness    Pain Severity: 7/10; sharp; intermittent    Temperature: unknown     What has been tried: multiple OTC treatments and not helping;    LMP: 4/15/2022 Pregnant: No    Recommended disposition: See in Office Today    Care advice provided, patient verbalizes understanding; denies any other questions or concerns; instructed to call back for any new or worsening symptoms. Patient/Caller agrees with recommended disposition; writer provided warm transfer to [employer] at [facility] for appointment scheduling    Attention Provider: Thank you for allowing me to participate in the care of your patient. The patient was connected to triage in response to information provided to the [facility]. Please do not respond through this encounter as the response is not directed to a shared pool.       Reason for Disposition   SEVERE sore throat pain    Protocols used: SORE THROAT-ADULT-OH

## 2022-08-19 ENCOUNTER — OFFICE VISIT (OUTPATIENT)
Dept: FAMILY MEDICINE CLINIC | Age: 27
End: 2022-08-19
Payer: MEDICAID

## 2022-08-19 VITALS
TEMPERATURE: 97.7 F | SYSTOLIC BLOOD PRESSURE: 106 MMHG | WEIGHT: 135.2 LBS | OXYGEN SATURATION: 99 % | RESPIRATION RATE: 17 BRPM | HEIGHT: 63 IN | BODY MASS INDEX: 23.96 KG/M2 | HEART RATE: 64 BPM | DIASTOLIC BLOOD PRESSURE: 73 MMHG

## 2022-08-19 DIAGNOSIS — J45.20 MILD INTERMITTENT ASTHMA, UNSPECIFIED WHETHER COMPLICATED: Primary | ICD-10-CM

## 2022-08-19 DIAGNOSIS — R73.03 BORDERLINE DIABETES MELLITUS: ICD-10-CM

## 2022-08-19 DIAGNOSIS — M54.2 NECK PAIN: ICD-10-CM

## 2022-08-19 DIAGNOSIS — Z13.31 NEGATIVE DEPRESSION SCREENING: ICD-10-CM

## 2022-08-19 DIAGNOSIS — E78.5 DYSLIPIDEMIA (HIGH LDL; LOW HDL): ICD-10-CM

## 2022-08-19 DIAGNOSIS — D50.0 IRON DEFICIENCY ANEMIA DUE TO CHRONIC BLOOD LOSS: ICD-10-CM

## 2022-08-19 DIAGNOSIS — E03.9 HYPOTHYROIDISM, UNSPECIFIED TYPE: ICD-10-CM

## 2022-08-19 DIAGNOSIS — Z11.59 NEED FOR HEPATITIS C SCREENING TEST: ICD-10-CM

## 2022-08-19 DIAGNOSIS — E55.9 VITAMIN D DEFICIENCY: ICD-10-CM

## 2022-08-19 DIAGNOSIS — R35.0 FREQUENCY OF URINATION: ICD-10-CM

## 2022-08-19 PROCEDURE — 99214 OFFICE O/P EST MOD 30 MIN: CPT | Performed by: INTERNAL MEDICINE

## 2022-08-19 RX ORDER — MELOXICAM 15 MG/1
15 TABLET ORAL DAILY
Qty: 20 TABLET | Refills: 0 | Status: SHIPPED | OUTPATIENT
Start: 2022-08-19 | End: 2022-08-24 | Stop reason: SDUPTHER

## 2022-08-19 RX ORDER — METHOCARBAMOL 500 MG/1
500 TABLET, FILM COATED ORAL 4 TIMES DAILY
Qty: 20 TABLET | Refills: 1 | Status: SHIPPED | OUTPATIENT
Start: 2022-08-19 | End: 2022-08-24 | Stop reason: SDUPTHER

## 2022-08-19 RX ORDER — ALBUTEROL SULFATE 90 UG/1
1 AEROSOL, METERED RESPIRATORY (INHALATION)
Qty: 18 G | Refills: 1 | Status: SHIPPED | OUTPATIENT
Start: 2022-08-19

## 2022-08-19 RX ORDER — ACETAMINOPHEN 500 MG
2000 TABLET ORAL DAILY
Qty: 30 CAPSULE | Refills: 2 | Status: SHIPPED | OUTPATIENT
Start: 2022-08-19

## 2022-08-19 NOTE — PROGRESS NOTES
Identified pt with two pt identifiers(name and ). Reviewed record in preparation for visit and have obtained necessary documentation. All patient medications has been reviewed. Chief Complaint   Patient presents with    Neck Pain     Patient stated she has neck pain onset 3  weeks ago no known injury tightness  with some stiffness     Complete Physical     Patient stated she would like reffearl to a dermatologist may dry skin  very itchy         3 most recent PHQ Screens 2022   Little interest or pleasure in doing things Not at all   Feeling down, depressed, irritable, or hopeless Not at all   Total Score PHQ 2 0     Abuse Screening Questionnaire 2022   Do you ever feel afraid of your partner? N   Are you in a relationship with someone who physically or mentally threatens you? N   Is it safe for you to go home? Y       Health Maintenance Due   Topic    Hepatitis C Screening     COVID-19 Vaccine (1)    DTaP/Tdap/Td series (1 - Tdap)    Depression Screen      Health Maintenance Review: Patient reminded of \"due or due soon\" health maintenance. I have asked the patient to contact his/her primary care provider (PCP) for follow-up on his/her health maintenance. Vitals:    22 1430   BP: 106/73   Pulse: 64   Resp: 17   Temp: 97.7 °F (36.5 °C)   TempSrc: Temporal   SpO2: 99%   Weight: 135 lb 3.2 oz (61.3 kg)   Height: 5' 3\" (1.6 m)       Wt Readings from Last 3 Encounters:   22 135 lb 3.2 oz (61.3 kg)   20 131 lb 6.4 oz (59.6 kg)   20 127 lb 10.3 oz (57.9 kg)     Temp Readings from Last 3 Encounters:   22 97.7 °F (36.5 °C) (Temporal)   20 97.3 °F (36.3 °C) (Temporal)   20 100.3 °F (37.9 °C)     BP Readings from Last 3 Encounters:   22 106/73   20 107/60   20 108/63     Pulse Readings from Last 3 Encounters:   22 64   20 76   20 (!) 104       1. \"Have you been to the ER, urgent care clinic since your last visit?   Hospitalized since your last visit? \" No    2. \"Have you seen or consulted any other health care providers outside of the [address] [place] [address] since your last visit? \" No     3. For patients aged 39-70: Has the patient had a colonoscopy / FIT/ Cologuard? No      If the patient is female:    4. For patients aged 41-77: Has the patient had a mammogram within the past 2 years? NA - based on age or sex      11. For patients aged 21-65: Has the patient had a pap smear?  NA - based on age or sex

## 2022-08-19 NOTE — PROGRESS NOTES
Chief Complaint   Patient presents with    Neck Pain     Patient stated she has neck pain onset 3  weeks ago no known injury tightness  with some stiffness     Complete Physical     Patient stated she would like reffearl to a dermatologist may dry skin  very itchy       HPI:  Raghavendra Giles is a 32 y.o. female presents with 3 week complaints of neck pain associated with tightness with some stiffness. No known injury. Patient is also due for complete physical. Patient is also requesting for a referral to a dermatologist for dry skin, itch skin. .  Review of Systems  As per hpi    Past Medical History:   Diagnosis Date    Asthma     UTI (urinary tract infection)     Vitamin D deficiency      No past surgical history on file. Social History     Socioeconomic History    Marital status: SINGLE   Tobacco Use    Smoking status: Never    Smokeless tobacco: Never   Substance and Sexual Activity    Alcohol use: Yes     Comment: socially    Drug use: Never    Sexual activity: Yes     Partners: Female     Birth control/protection: None     No family history on file. Current Outpatient Medications   Medication Sig Dispense Refill    albuterol (PROVENTIL HFA, VENTOLIN HFA, PROAIR HFA) 90 mcg/actuation inhaler Take 1 Puff by inhalation every four (4) hours as needed for Wheezing. 18 g 1    cholecalciferol (VITAMIN D3) (2,000 UNITS /50 MCG) cap capsule Take 1 Capsule by mouth daily.  30 Capsule 2     No Known Allergies    Objective:  Visit Vitals  /73 (BP 1 Location: Left upper arm, BP Patient Position: Sitting, BP Cuff Size: Adult)   Pulse 64   Temp 97.7 °F (36.5 °C) (Temporal)   Resp 17   Ht 5' 3\" (1.6 m)   Wt 135 lb 3.2 oz (61.3 kg)   SpO2 99%   BMI 23.95 kg/m²     Physical Exam:   General appearance - alert, well appearing in no distress  Mental status - alert, oriented to person, place, and time  EYE-PERRL, EOMI  ENT-ENT exam normal, no neck nodes or sinus tenderness  Neck - supple, no significant adenopathy   Chest - clear to auscultation, no wheezes, rales or rhonchi  Heart - normal rate, regular rhythm, no murmurs  Abdomen - soft, nontender, nondistended, no organomegaly  Ext-peripheral pulses normal, no pedal edema  Neuro -, no focal findings   Neck- normal C-spine, no tenderness, full ROM without pain    Results for orders placed or performed in visit on 36/14/24   METABOLIC PANEL, COMPREHENSIVE   Result Value Ref Range    Glucose 78 65 - 99 mg/dL    BUN 16 6 - 20 mg/dL    Creatinine 0.98 0.57 - 1.00 mg/dL    GFR est non-AA 80 >59 mL/min/1.73    GFR est AA 93 >59 mL/min/1.73    BUN/Creatinine ratio 16 9 - 23    Sodium 140 134 - 144 mmol/L    Potassium 4.0 3.5 - 5.2 mmol/L    Chloride 102 96 - 106 mmol/L    CO2 25 20 - 29 mmol/L    Calcium 9.2 8.7 - 10.2 mg/dL    Protein, total 7.3 6.0 - 8.5 g/dL    Albumin 4.3 3.9 - 5.0 g/dL    GLOBULIN, TOTAL 3.0 1.5 - 4.5 g/dL    A-G Ratio 1.4 1.2 - 2.2    Bilirubin, total 0.6 0.0 - 1.2 mg/dL    Alk.  phosphatase 62 39 - 117 IU/L    AST (SGOT) 18 0 - 40 IU/L    ALT (SGPT) 10 0 - 32 IU/L   CBC W/O DIFF   Result Value Ref Range    WBC 7.6 3.4 - 10.8 x10E3/uL    RBC 4.24 3.77 - 5.28 x10E6/uL    HGB 12.5 11.1 - 15.9 g/dL    HCT 37.1 34.0 - 46.6 %    MCV 88 79 - 97 fL    MCH 29.5 26.6 - 33.0 pg    MCHC 33.7 31.5 - 35.7 g/dL    RDW 11.9 11.7 - 15.4 %    PLATELET 791 865 - 004 x10E3/uL   LIPID PANEL   Result Value Ref Range    Cholesterol, total 147 100 - 199 mg/dL    Triglyceride 88 0 - 149 mg/dL    HDL Cholesterol 70 >39 mg/dL    VLDL, calculated 16 5 - 40 mg/dL    LDL, calculated 61 0 - 99 mg/dL   HEMOGLOBIN A1C WITH EAG   Result Value Ref Range    Hemoglobin A1c 5.1 4.8 - 5.6 %    Estimated average glucose 100 mg/dL   TSH 3RD GENERATION   Result Value Ref Range    TSH 1.510 0.450 - 4.500 uIU/mL   VITAMIN D, 25 HYDROXY   Result Value Ref Range    VITAMIN D, 25-HYDROXY 26.7 (L) 30.0 - 100.0 ng/mL   URINALYSIS W/ RFLX MICROSCOPIC   Result Value Ref Range    Specific Gravity 1.028 1.005 - 1.030 pH (UA) 6.0 5.0 - 7.5    Color Yellow Yellow    Appearance Clear Clear    Leukocyte Esterase Negative Negative    Protein Negative Negative/Trace    Glucose Negative Negative    Ketone Negative Negative    Blood 2+ (A) Negative    Bilirubin Negative Negative    Urobilinogen 1.0 0.2 - 1.0 mg/dL    Nitrites Negative Negative    Microscopic Examination See additional order    MICROSCOPIC EXAMINATION   Result Value Ref Range    WBC 0-5 0 - 5 /hpf    RBC 11-30 (A) 0 - 2 /hpf    Epithelial cells 0-10 0 - 10 /hpf    Casts None seen None seen /lpf    Mucus Present Not Estab. Bacteria Few None seen/Few     Assessment/Plan:  Diagnoses and all orders for this visit:    Mild intermittent asthma, unspecified whether complicated  -     METABOLIC PANEL, COMPREHENSIVE; Future  -     CBC WITH AUTOMATED DIFF; Future  -     albuterol (PROVENTIL HFA, VENTOLIN HFA, PROAIR HFA) 90 mcg/actuation inhaler; Take 1 Puff by inhalation every four (4) hours as needed for Wheezing., Normal, Disp-18 g, R-1    Vitamin D deficiency  -     VITAMIN D, 25 HYDROXY; Future  -     cholecalciferol (VITAMIN D3) (2,000 UNITS /50 MCG) cap capsule; Take 1 Capsule by mouth daily. , Normal, Disp-30 Capsule, R-2    Dyslipidemia (high LDL; low HDL)  -     LIPID PANEL; Future    Frequency of urination  -     URINALYSIS W/ RFLX MICROSCOPIC; Future    Borderline diabetes mellitus  -     HEMOGLOBIN A1C WITH EAG; Future    Iron deficiency anemia due to chronic blood loss  -     CBC WITH AUTOMATED DIFF; Future    Need for hepatitis C screening test  -     HEPATITIS C AB; Future    Hypothyroidism, unspecified type  -     TSH 3RD GENERATION; Future    Negative depression screening    Neck pain  -     methocarbamoL (ROBAXIN) 500 mg tablet; Take 1 Tablet by mouth four (4) times daily. , Normal, Disp-20 Tablet, R-1  -     meloxicam (MOBIC) 15 mg tablet; Take 1 Tablet by mouth daily. , Normal, Disp-20 Tablet, R-0    Neck Pain: Care Instructions  Your Care Instructions  You can have neck pain anywhere from the bottom of your head to the top of your shoulders. It can spread to the upper back or arms. Injuries, painting a ceiling, sleeping with your neck twisted, staying in one position for too long, and many other activities can cause neck pain. Most neck pain gets better with home care. Your doctor may recommend medicine to relieve pain or relax your muscles. He or she may suggest exercise and physical therapy to increase flexibility and relieve stress. You may need to wear a special (cervical) collar to support your neck for a day or two. Follow-up care is a key part of your treatment and safety. Be sure to make and go to all appointments, and call your doctor if you are having problems. It's also a good idea to know your test results and keep a list of the medicines you take. How can you care for yourself at home? Try using a heating pad on a low or medium setting for 15 to 20 minutes every 2 or 3 hours. Try a warm shower in place of one session with the heating pad. You can also try an ice pack for 10 to 15 minutes every 2 to 3 hours. Put a thin cloth between the ice and your skin. Take pain medicines exactly as directed. If the doctor gave you a prescription medicine for pain, take it as prescribed. If you are not taking a prescription pain medicine, ask your doctor if you can take an over-the-counter medicine. If your doctor recommends a cervical collar, wear it exactly as directed. When should you call for help? Call your doctor now or seek immediate medical care if:   You have new or worsening numbness in your arms, buttocks or legs. You have new or worsening weakness in your arms or legs. (This could make it hard to stand up.)    You lose control of your bladder or bowels. Watch closely for changes in your health, and be sure to contact your doctor if:   Your neck pain is getting worse. You are not getting better after 1 week.     You do not get better as expected. Where can you learn more? Go to http://www.gray.com/. Enter [id number] in the search box to learn more about \"Neck Pain: Care Instructions. \"  Current as of: March 21, 2017  Content Version: 11.5  © 6457-6052 [employer]. Care instructions adapted under license by "[employer] (which disclaims liability or warranty for this information). If you have questions about a medical condition or this instruction, always ask your healthcare professional. [place] [address] any warranty or liability for your use of this information. Follow-up and Dispositions    Return 2-3 weeks, for f/u results.

## 2022-08-19 NOTE — TELEPHONE ENCOUNTER
Pt would like Meloxicam and Methocarbamol sent to [facility] on [employer] number to reach her is [phone]

## 2022-08-24 RX ORDER — MELOXICAM 15 MG/1
15 TABLET ORAL DAILY
Qty: 20 TABLET | Refills: 0 | Status: SHIPPED | OUTPATIENT
Start: 2022-08-24 | End: 2022-09-09 | Stop reason: SDUPTHER

## 2022-08-24 RX ORDER — METHOCARBAMOL 500 MG/1
500 TABLET, FILM COATED ORAL 4 TIMES DAILY
Qty: 20 TABLET | Refills: 1 | Status: SHIPPED | OUTPATIENT
Start: 2022-08-24 | End: 2022-09-09 | Stop reason: SDUPTHER

## 2022-09-09 ENCOUNTER — VIRTUAL VISIT (OUTPATIENT)
Dept: FAMILY MEDICINE CLINIC | Age: 27
End: 2022-09-09
Payer: MEDICAID

## 2022-09-09 DIAGNOSIS — L81.9 DISCOLORATION OF SKIN: ICD-10-CM

## 2022-09-09 DIAGNOSIS — N30.00 ACUTE CYSTITIS WITHOUT HEMATURIA: ICD-10-CM

## 2022-09-09 DIAGNOSIS — M54.2 NECK PAIN: Primary | ICD-10-CM

## 2022-09-09 PROCEDURE — 99214 OFFICE O/P EST MOD 30 MIN: CPT | Performed by: INTERNAL MEDICINE

## 2022-09-09 RX ORDER — METHOCARBAMOL 500 MG/1
500 TABLET, FILM COATED ORAL 4 TIMES DAILY
Qty: 20 TABLET | Refills: 1 | Status: SHIPPED | OUTPATIENT
Start: 2022-09-09

## 2022-09-09 RX ORDER — CIPROFLOXACIN 500 MG/1
500 TABLET ORAL 2 TIMES DAILY
Qty: 14 TABLET | Refills: 0 | Status: SHIPPED | OUTPATIENT
Start: 2022-09-09 | End: 2022-09-16

## 2022-09-09 RX ORDER — MELOXICAM 15 MG/1
15 TABLET ORAL DAILY
Qty: 20 TABLET | Refills: 0 | Status: SHIPPED | OUTPATIENT
Start: 2022-09-09

## 2022-09-09 NOTE — PROGRESS NOTES
Chief Complaint   Patient presents with    Muscle Pain     Follow up     Skin Problem     Referral to derm  or topical cream      1. \"Have you been to the ER, urgent care clinic since your last visit? Hospitalized since your last visit? \" No    2. \"Have you seen or consulted any other health care providers outside of the [address] [place] [address] since your last visit? \" No     3. For patients aged 39-70: Has the patient had a colonoscopy / FIT/ Cologuard? No      If the patient is female:    4. For patients aged 41-77: Has the patient had a mammogram within the past 2 years? No      5. For patients aged 21-65: Has the patient had a pap smear?  No

## 2022-09-09 NOTE — PROGRESS NOTES
Chief Complaint   Patient presents with    Muscle Pain     Follow up     Skin Problem     Referral to derm  or topical cream      HPI:  Rubio Sheikh is a 32 y.o. female who was seen by synchronous (real-time) audio-video technology on 9/9/2022 for Muscle Pain (Follow up ) and Skin Problem (Referral to derm  or topical cream )    Assessment & Plan:   Diagnoses and all orders for this visit:    Neck pain  -     meloxicam (MOBIC) 15 mg tablet; Take 1 Tablet by mouth daily. , Normal, Disp-20 Tablet, R-0  -     methocarbamoL (ROBAXIN) 500 mg tablet; Take 1 Tablet by mouth four (4) times daily. , Normal, Disp-20 Tablet, R-1    Discoloration of skin  -     REFERRAL TO DERMATOLOGY    Acute cystitis without hematuria  -     ciprofloxacin HCl (CIPRO) 500 mg tablet; Take 1 Tablet by mouth two (2) times a day for 7 days. , Normal, Disp-14 Tablet, R-0      I spent at least 20 minutes on this visit with this established patient. 712  Subjective:   Rubio Sheikh is a 32 y.o. female is seen for lab review. Results show infection in urine. Otherwise the rest of results are within normal limits. She also has c/o changes in the skin color. She is requesting for a referral to dermatologist.    Prior to Admission medications    Medication Sig Start Date End Date Taking? Authorizing Provider   meloxicam (MOBIC) 15 mg tablet Take 1 Tablet by mouth daily. 8/24/22  Yes Nayla Bustos MD   methocarbamoL (ROBAXIN) 500 mg tablet Take 1 Tablet by mouth four (4) times daily. 8/24/22  Yes Ozzy Orona MD   albuterol (PROVENTIL HFA, VENTOLIN HFA, PROAIR HFA) 90 mcg/actuation inhaler Take 1 Puff by inhalation every four (4) hours as needed for Wheezing. 8/19/22  Yes Ozzy Orona MD   cholecalciferol (VITAMIN D3) (2,000 UNITS /50 MCG) cap capsule Take 1 Capsule by mouth daily. 8/19/22  Yes Ozzy Orona MD     There are no problems to display for this patient.    Current Outpatient Medications   Medication Sig Dispense Refill    meloxicam (MOBIC) 15 mg tablet Take 1 Tablet by mouth daily. 20 Tablet 0    methocarbamoL (ROBAXIN) 500 mg tablet Take 1 Tablet by mouth four (4) times daily. 20 Tablet 1    albuterol (PROVENTIL HFA, VENTOLIN HFA, PROAIR HFA) 90 mcg/actuation inhaler Take 1 Puff by inhalation every four (4) hours as needed for Wheezing. 18 g 1    cholecalciferol (VITAMIN D3) (2,000 UNITS /50 MCG) cap capsule Take 1 Capsule by mouth daily. 30 Capsule 2     No Known Allergies  Past Medical History:   Diagnosis Date    Asthma     UTI (urinary tract infection)     Vitamin D deficiency      No past surgical history on file. No family history on file. Social History     Tobacco Use    Smoking status: Never    Smokeless tobacco: Never   Substance Use Topics    Alcohol use: Yes     Comment: socially     ROS:  As per hpi    Objective:     Patient-Reported Vitals 10/22/2020   Patient-Reported Weight -   Patient-Reported Height -   Patient-Reported Pulse -   Patient-Reported Temperature -   Patient-Reported SpO2 -   Patient-Reported Peak Flow -   Patient-Reported LMP 10/22/2020      General: alert, cooperative, no distress   Mental  status: normal mood, behavior, speech, dress, motor activity, and thought processes, able to follow commands   HENT: NCAT   Neck: no visualized mass   Resp: no respiratory distress   Neuro: no gross deficits   Skin: no discoloration or lesions of concern on visible areas   Psychiatric: normal affect, consistent with stated mood, no evidence of hallucinations     Additional exam findings: We discussed the expected course, resolution and complications of the diagnosis(es) in detail. Medication risks, benefits, costs, interactions, and alternatives were discussed as indicated. I advised her to contact the office if her condition worsens, changes or fails to improve as anticipated. She expressed understanding with the diagnosis(es) and plan. Olga Ayala, was evaluated through a synchronous (real-time) audio-video encounter.  The patient (or guardian if applicable) is aware that this is a billable service, which includes applicable co-pays. This Virtual Visit was conducted with patient's (and/or legal guardian's) consent. The visit was conducted pursuant to the emergency declaration under the [facility], [address] [place] [address] authority and the "[employer] and [email or web address] General Act. Patient identification was verified, and a caregiver was present when appropriate. The patient was located at: Home: [address] [place] [address] [id number] [phone]  The provider was located at:  Facility (Appt Department): [address] [facility] [address]  [address] [facility]        Will Stephenson MD

## 2022-09-16 LAB
25(OH)D3+25(OH)D2 SERPL-MCNC: 24.4 NG/ML (ref 30–100)
ALBUMIN SERPL-MCNC: 4.7 G/DL (ref 3.9–5)
ALBUMIN/GLOB SERPL: 1.6 {RATIO} (ref 1.2–2.2)
ALP SERPL-CCNC: 73 IU/L (ref 44–121)
ALT SERPL-CCNC: 10 IU/L (ref 0–32)
APPEARANCE UR: ABNORMAL
AST SERPL-CCNC: 23 IU/L (ref 0–40)
BACTERIA #/AREA URNS HPF: ABNORMAL /[HPF]
BASOPHILS # BLD AUTO: 0 X10E3/UL (ref 0–0.2)
BASOPHILS NFR BLD AUTO: 1 %
BILIRUB SERPL-MCNC: 1.4 MG/DL (ref 0–1.2)
BILIRUB UR QL STRIP: NEGATIVE
BUN SERPL-MCNC: 12 MG/DL (ref 6–20)
BUN/CREAT SERPL: 15 (ref 9–23)
CALCIUM SERPL-MCNC: 9.7 MG/DL (ref 8.7–10.2)
CASTS URNS QL MICRO: ABNORMAL /LPF
CHLORIDE SERPL-SCNC: 100 MMOL/L (ref 96–106)
CHOLEST SERPL-MCNC: 159 MG/DL (ref 100–199)
CO2 SERPL-SCNC: 25 MMOL/L (ref 20–29)
COLOR UR: YELLOW
CREAT SERPL-MCNC: 0.79 MG/DL (ref 0.57–1)
EGFR: 105 ML/MIN/1.73
EOSINOPHIL # BLD AUTO: 0.1 X10E3/UL (ref 0–0.4)
EOSINOPHIL NFR BLD AUTO: 1 %
EPI CELLS #/AREA URNS HPF: >10 /HPF (ref 0–10)
ERYTHROCYTE [DISTWIDTH] IN BLOOD BY AUTOMATED COUNT: 12.9 % (ref 11.7–15.4)
EST. AVERAGE GLUCOSE BLD GHB EST-MCNC: 105 MG/DL
GLOBULIN SER CALC-MCNC: 2.9 G/DL (ref 1.5–4.5)
GLUCOSE SERPL-MCNC: 47 MG/DL (ref 65–99)
GLUCOSE UR QL STRIP: NEGATIVE
HBA1C MFR BLD: 5.3 % (ref 4.8–5.6)
HCT VFR BLD AUTO: 41.3 % (ref 34–46.6)
HCV AB S/CO SERPL IA: <0.1 S/CO RATIO (ref 0–0.9)
HDLC SERPL-MCNC: 64 MG/DL
HGB BLD-MCNC: 13.6 G/DL (ref 11.1–15.9)
HGB UR QL STRIP: NEGATIVE
IMM GRANULOCYTES # BLD AUTO: 0 X10E3/UL (ref 0–0.1)
IMM GRANULOCYTES NFR BLD AUTO: 0 %
KETONES UR QL STRIP: NEGATIVE
LDLC SERPL CALC-MCNC: 81 MG/DL (ref 0–99)
LEUKOCYTE ESTERASE UR QL STRIP: ABNORMAL
LYMPHOCYTES # BLD AUTO: 2.6 X10E3/UL (ref 0.7–3.1)
LYMPHOCYTES NFR BLD AUTO: 44 %
MCH RBC QN AUTO: 28.5 PG (ref 26.6–33)
MCHC RBC AUTO-ENTMCNC: 32.9 G/DL (ref 31.5–35.7)
MCV RBC AUTO: 86 FL (ref 79–97)
MICRO URNS: ABNORMAL
MONOCYTES # BLD AUTO: 0.5 X10E3/UL (ref 0.1–0.9)
MONOCYTES NFR BLD AUTO: 9 %
NEUTROPHILS # BLD AUTO: 2.8 X10E3/UL (ref 1.4–7)
NEUTROPHILS NFR BLD AUTO: 45 %
NITRITE UR QL STRIP: NEGATIVE
PH UR STRIP: 5.5 [PH] (ref 5–7.5)
PLATELET # BLD AUTO: 277 X10E3/UL (ref 150–450)
POTASSIUM SERPL-SCNC: 4.1 MMOL/L (ref 3.5–5.2)
PROT SERPL-MCNC: 7.6 G/DL (ref 6–8.5)
PROT UR QL STRIP: NEGATIVE
RBC # BLD AUTO: 4.78 X10E6/UL (ref 3.77–5.28)
RBC #/AREA URNS HPF: ABNORMAL /HPF (ref 0–2)
SODIUM SERPL-SCNC: 139 MMOL/L (ref 134–144)
SP GR UR STRIP: 1.03 (ref 1–1.03)
TRIGL SERPL-MCNC: 75 MG/DL (ref 0–149)
TSH SERPL DL<=0.005 MIU/L-ACNC: 0.8 UIU/ML (ref 0.45–4.5)
UROBILINOGEN UR STRIP-MCNC: 0.2 MG/DL (ref 0.2–1)
VLDLC SERPL CALC-MCNC: 14 MG/DL (ref 5–40)
WBC # BLD AUTO: 6.1 X10E3/UL (ref 3.4–10.8)
WBC #/AREA URNS HPF: ABNORMAL /HPF (ref 0–5)

## 2022-11-18 ENCOUNTER — OFFICE VISIT (OUTPATIENT)
Dept: FAMILY MEDICINE CLINIC | Age: 27
End: 2022-11-18
Payer: MEDICAID

## 2022-11-18 ENCOUNTER — HOSPITAL ENCOUNTER (OUTPATIENT)
Dept: GENERAL RADIOLOGY | Age: 27
Discharge: HOME | End: 2022-11-18
Payer: MEDICAID

## 2022-11-18 VITALS
HEIGHT: 63 IN | HEART RATE: 71 BPM | TEMPERATURE: 97.7 F | WEIGHT: 139.8 LBS | OXYGEN SATURATION: 100 % | DIASTOLIC BLOOD PRESSURE: 57 MMHG | BODY MASS INDEX: 24.77 KG/M2 | RESPIRATION RATE: 16 BRPM | SYSTOLIC BLOOD PRESSURE: 93 MMHG

## 2022-11-18 DIAGNOSIS — M23.51 RECURRENT RIGHT KNEE INSTABILITY: ICD-10-CM

## 2022-11-18 DIAGNOSIS — S99.922A INJURY OF LEFT GREAT TOE, INITIAL ENCOUNTER: ICD-10-CM

## 2022-11-18 DIAGNOSIS — S99.922A INJURY OF LEFT GREAT TOE, INITIAL ENCOUNTER: Primary | ICD-10-CM

## 2022-11-18 PROCEDURE — 99214 OFFICE O/P EST MOD 30 MIN: CPT | Performed by: INTERNAL MEDICINE

## 2022-11-18 PROCEDURE — 73660 X-RAY EXAM OF TOE(S): CPT

## 2022-11-18 RX ORDER — MELOXICAM 15 MG/1
15 TABLET ORAL DAILY
Qty: 20 TABLET | Refills: 0 | Status: SHIPPED | OUTPATIENT
Start: 2022-11-18

## 2022-11-18 NOTE — PROGRESS NOTES
Gus Slaughter is a 32 y.o. female    Chief Complaint   Patient presents with    Toe Pain     Left foot big toe 3/10 started 10/28/22  Right Knee twisted/popping out of place since 2016       Visit Vitals  BP (!) 93/57 (BP 1 Location: Left upper arm, BP Patient Position: Sitting, BP Cuff Size: Adult)   Pulse 71   Temp 97.7 °F (36.5 °C) (Temporal)   Resp 16   Ht 5' 3\" (1.6 m)   Wt 139 lb 12.8 oz (63.4 kg)   SpO2 100%   BMI 24.76 kg/m²           1. Have you been to the ER, urgent care clinic since your last visit? Hospitalized since your last visit? NO    2. Have you seen or consulted any other health care providers outside of the [address] [place] [address] since your last visit? Include any pap smears or colon screening.  NO

## 2022-11-18 NOTE — PROGRESS NOTES
Chief Complaint   Patient presents with    Toe Pain     Left foot big toe 3/10 started 10/28/22  Right Knee twisted/popping out of place since 2016     HPI:  Oli Francis is a 32 y.o. female presents with c/o left foot big toe injury following a fall 3/10/22. Also, she has c/o right knee twisting and popping out of place since 2016 as result of injury during basketball game in high school. Since then, symptoms have become progressively worse. Review of Systems  As per hpi    Past Medical History:   Diagnosis Date    Asthma     UTI (urinary tract infection)     Vitamin D deficiency      History reviewed. No pertinent surgical history. Social History     Socioeconomic History    Marital status: SINGLE   Tobacco Use    Smoking status: Never    Smokeless tobacco: Never   Substance and Sexual Activity    Alcohol use: Yes     Comment: socially    Drug use: Never    Sexual activity: Yes     Partners: Female     Birth control/protection: None     History reviewed. No pertinent family history. Current Outpatient Medications   Medication Sig Dispense Refill    meloxicam (MOBIC) 15 mg tablet Take 1 Tablet by mouth daily. 20 Tablet 0    methocarbamoL (ROBAXIN) 500 mg tablet Take 1 Tablet by mouth four (4) times daily. 20 Tablet 1    albuterol (PROVENTIL HFA, VENTOLIN HFA, PROAIR HFA) 90 mcg/actuation inhaler Take 1 Puff by inhalation every four (4) hours as needed for Wheezing. 18 g 1    cholecalciferol (VITAMIN D3) (2,000 UNITS /50 MCG) cap capsule Take 1 Capsule by mouth daily.  30 Capsule 2     No Known Allergies    Objective:  Visit Vitals  BP (!) 93/57 (BP 1 Location: Left upper arm, BP Patient Position: Sitting, BP Cuff Size: Adult)   Pulse 71   Temp 97.7 °F (36.5 °C) (Temporal)   Resp 16   Ht 5' 3\" (1.6 m)   Wt 139 lb 12.8 oz (63.4 kg)   SpO2 100%   BMI 24.76 kg/m²     Physical Exam:   General appearance - alert, well appearing in no distress  Mental status - alert, oriented to person, place, and time  Chest - clear to auscultation, no wheezes, rales or rhonchi  Heart - normal rate, regular rhythm, normal S1, S2, no murmurs, rubs, clicks or gallops   Ext-peripheral pulses normal, no pedal edema, tender left great toe  Neuro - no focal findings  Knee-normal exam, no swelling, tenderness, FROM bilaterally    Results for orders placed or performed in visit on 08/19/22   CBC WITH AUTOMATED DIFF   Result Value Ref Range    WBC 6.1 3.4 - 10.8 x10E3/uL    RBC 4.78 3.77 - 5.28 x10E6/uL    HGB 13.6 11.1 - 15.9 g/dL    HCT 41.3 34.0 - 46.6 %    MCV 86 79 - 97 fL    MCH 28.5 26.6 - 33.0 pg    MCHC 32.9 31.5 - 35.7 g/dL    RDW 12.9 11.7 - 15.4 %    PLATELET 920 884 - 860 x10E3/uL    NEUTROPHILS 45 Not Estab. %    Lymphocytes 44 Not Estab. %    MONOCYTES 9 Not Estab. %    EOSINOPHILS 1 Not Estab. %    BASOPHILS 1 Not Estab. %    ABS. NEUTROPHILS 2.8 1.4 - 7.0 x10E3/uL    Abs Lymphocytes 2.6 0.7 - 3.1 x10E3/uL    ABS. MONOCYTES 0.5 0.1 - 0.9 x10E3/uL    ABS. EOSINOPHILS 0.1 0.0 - 0.4 x10E3/uL    ABS. BASOPHILS 0.0 0.0 - 0.2 x10E3/uL    IMMATURE GRANULOCYTES 0 Not Estab. %    ABS. IMM. GRANS. 0.0 0.0 - 0.1 Y64Y1/   METABOLIC PANEL, COMPREHENSIVE   Result Value Ref Range    Glucose 47 (L) 65 - 99 mg/dL    BUN 12 6 - 20 mg/dL    Creatinine 0.79 0.57 - 1.00 mg/dL    eGFR 105 >59 mL/min/1.73    BUN/Creatinine ratio 15 9 - 23    Sodium 139 134 - 144 mmol/L    Potassium 4.1 3.5 - 5.2 mmol/L    Chloride 100 96 - 106 mmol/L    CO2 25 20 - 29 mmol/L    Calcium 9.7 8.7 - 10.2 mg/dL    Protein, total 7.6 6.0 - 8.5 g/dL    Albumin 4.7 3.9 - 5.0 g/dL    GLOBULIN, TOTAL 2.9 1.5 - 4.5 g/dL    A-G Ratio 1.6 1.2 - 2.2    Bilirubin, total 1.4 (H) 0.0 - 1.2 mg/dL    Alk.  phosphatase 73 44 - 121 IU/L    AST (SGOT) 23 0 - 40 IU/L    ALT (SGPT) 10 0 - 32 IU/L   URINALYSIS W/ RFLX MICROSCOPIC   Result Value Ref Range    Specific Gravity 1.028 1.005 - 1.030    pH (UA) 5.5 5.0 - 7.5    Color Yellow Yellow    Appearance Cloudy (A) Clear    Leukocyte Esterase 1+ (A) Negative    Protein Negative Negative/Trace    Glucose Negative Negative    Ketone Negative Negative    Blood Negative Negative    Bilirubin Negative Negative    Urobilinogen 0.2 0.2 - 1.0 mg/dL    Nitrites Negative Negative    Microscopic Examination See additional order    MICROSCOPIC EXAMINATION   Result Value Ref Range    WBC 11-30 (A) 0 - 5 /hpf    RBC None seen 0 - 2 /hpf    Epithelial cells >10 (A) 0 - 10 /hpf    Casts None seen None seen /lpf    Bacteria Many (A) None seen/Few   LIPID PANEL   Result Value Ref Range    Cholesterol, total 159 100 - 199 mg/dL    Triglyceride 75 0 - 149 mg/dL    HDL Cholesterol 64 >39 mg/dL    VLDL, calculated 14 5 - 40 mg/dL    LDL, calculated 81 0 - 99 mg/dL   HEMOGLOBIN A1C WITH EAG   Result Value Ref Range    Hemoglobin A1c 5.3 4.8 - 5.6 %    Estimated average glucose 105 mg/dL   TSH 3RD GENERATION   Result Value Ref Range    TSH 0.801 0.450 - 4.500 uIU/mL   VITAMIN D, 25 HYDROXY   Result Value Ref Range    VITAMIN D, 25-HYDROXY 24.4 (L) 30.0 - 100.0 ng/mL   HEPATITIS C AB   Result Value Ref Range    Hep C Virus Ab <0.1 0.0 - 0.9 s/co ratio     Assessment/Plan:  Diagnoses and all orders for this visit:    Injury of left great toe, initial encounter  -     meloxicam (MOBIC) 15 mg tablet; Take 1 Tablet by mouth daily. , Normal, Disp-20 Tablet, R-0  -     XR GREAT TOE LT MIN 2 V; Future    Recurrent right knee instability  -     meloxicam (MOBIC) 15 mg tablet; Take 1 Tablet by mouth daily. , Normal, Disp-20 Tablet, R-0  -     REFERRAL TO PHYSICAL THERAPY    Other orders  -     Cancel: REFERRAL TO PHYSICAL THERAPY  -     Cancel: XR GREAT TOE RT MIN 2 V; Future      Follow-up and Dispositions    Return 1-2 weeks, for f/u treatment.

## 2022-11-21 DIAGNOSIS — S92.402A CLOSED NON-PHYSEAL FRACTURE OF PHALANX OF LEFT GREAT TOE, UNSPECIFIED PHALANX, INITIAL ENCOUNTER: Primary | ICD-10-CM

## 2022-12-05 ENCOUNTER — TELEPHONE (OUTPATIENT)
Dept: FAMILY MEDICINE CLINIC | Age: 27
End: 2022-12-05

## 2022-12-05 NOTE — TELEPHONE ENCOUNTER
----- Message from Kelly Caputo sent at 12/5/2022  3:58 PM EST -----  Subject: Results Request    QUESTIONS  Results: x-ray of left big toe second number to call [phone]; Ordered   by: Kale Rios   Date Performed: 2022-11-18  ---------------------------------------------------------------------------  --------------  Karma Rojas YLIT    [phone]; OK to leave message on voicemail  ---------------------------------------------------------------------------  --------------

## 2023-02-07 ENCOUNTER — NURSE TRIAGE (OUTPATIENT)
Dept: OTHER | Facility: CLINIC | Age: 28
End: 2023-02-07
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
## 2023-06-03 ENCOUNTER — HOSPITAL ENCOUNTER (EMERGENCY)
Facility: HOSPITAL | Age: 28
Discharge: HOME | End: 2023-06-04
Attending: EMERGENCY MEDICINE
Payer: MEDICAID

## 2023-06-03 VITALS
HEART RATE: 68 BPM | RESPIRATION RATE: 16 BRPM | TEMPERATURE: 98.3 F | OXYGEN SATURATION: 99 % | DIASTOLIC BLOOD PRESSURE: 79 MMHG | SYSTOLIC BLOOD PRESSURE: 114 MMHG
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
## 2023-06-03 DIAGNOSIS — S16.1XXA CERVICAL STRAIN, ACUTE, INITIAL ENCOUNTER: ICD-10-CM

## 2023-06-03 DIAGNOSIS — M54.2 ACUTE NECK PAIN: Primary | ICD-10-CM

## 2023-06-03 PROCEDURE — 99283 EMERGENCY DEPT VISIT LOW MDM: CPT

## 2023-06-03 ASSESSMENT — PAIN SCALES - GENERAL: PAINLEVEL_OUTOF10: 8

## 2023-06-04 ENCOUNTER — APPOINTMENT (OUTPATIENT)
Facility: HOSPITAL | Age: 28
End: 2023-06-04
Payer: MEDICAID

## 2023-06-04 PROCEDURE — 81025 URINE PREGNANCY TEST: CPT

## 2023-06-04 PROCEDURE — 72050 X-RAY EXAM NECK SPINE 4/5VWS: CPT

## 2023-06-04 RX ORDER — CYCLOBENZAPRINE HCL 10 MG
10 TABLET ORAL 3 TIMES DAILY PRN
Qty: 21 TABLET | Refills: 0 | Status: SHIPPED | OUTPATIENT
Start: 2023-06-04 | End: 2023-06-14

## 2023-06-04 RX ORDER — IBUPROFEN 600 MG/1
600 TABLET ORAL EVERY 8 HOURS PRN
Qty: 20 TABLET | Refills: 0 | Status: SHIPPED | OUTPATIENT
Start: 2023-06-04

## 2023-06-04 ASSESSMENT — ENCOUNTER SYMPTOMS
ABDOMINAL PAIN: 0
BACK PAIN: 0

## 2023-06-04 ASSESSMENT — VISUAL ACUITY: OU: 1

## 2023-06-04 ASSESSMENT — LIFESTYLE VARIABLES
HOW OFTEN DO YOU HAVE A DRINK CONTAINING ALCOHOL: 2-4 TIMES A MONTH
HOW MANY STANDARD DRINKS CONTAINING ALCOHOL DO YOU HAVE ON A TYPICAL DAY: 1 OR 2

## 2023-06-05 LAB — HCG UR QL: NEGATIVE

## 2023-10-23 ENCOUNTER — OFFICE VISIT (OUTPATIENT)
Age: 28
End: 2023-10-23
Payer: MEDICAID

## 2023-10-23 VITALS
OXYGEN SATURATION: 98 % | TEMPERATURE: 97.8 F | SYSTOLIC BLOOD PRESSURE: 113 MMHG | RESPIRATION RATE: 20 BRPM | WEIGHT: 141 LBS | HEART RATE: 67 BPM | HEIGHT: 63 IN | BODY MASS INDEX: 24.98 KG/M2 | DIASTOLIC BLOOD PRESSURE: 78 MMHG

## 2023-10-23 DIAGNOSIS — M23.51 CHRONIC INSTABILITY OF KNEE, RIGHT KNEE: ICD-10-CM

## 2023-10-23 DIAGNOSIS — E55.9 VITAMIN D DEFICIENCY, UNSPECIFIED: ICD-10-CM

## 2023-10-23 DIAGNOSIS — Z11.4 ENCOUNTER FOR SCREENING FOR HUMAN IMMUNODEFICIENCY VIRUS (HIV): ICD-10-CM

## 2023-10-23 DIAGNOSIS — Z00.00 ENCOUNTER FOR ANNUAL PHYSICAL EXAM: Primary | ICD-10-CM

## 2023-10-23 DIAGNOSIS — S16.1XXD NECK STRAIN, SUBSEQUENT ENCOUNTER: ICD-10-CM

## 2023-10-23 DIAGNOSIS — Z12.4 PAP SMEAR FOR CERVICAL CANCER SCREENING: ICD-10-CM

## 2023-10-23 DIAGNOSIS — Z13.1 SCREENING FOR DIABETES MELLITUS (DM): ICD-10-CM

## 2023-10-23 DIAGNOSIS — S46.912D SHOULDER STRAIN, LEFT, SUBSEQUENT ENCOUNTER: ICD-10-CM

## 2023-10-23 DIAGNOSIS — Z13.29 SCREENING FOR THYROID DISORDER: ICD-10-CM

## 2023-10-23 DIAGNOSIS — R35.0 FREQUENCY OF URINATION: ICD-10-CM

## 2023-10-23 DIAGNOSIS — Z13.220 SCREENING CHOLESTEROL LEVEL: ICD-10-CM

## 2023-10-23 PROCEDURE — 99395 PREV VISIT EST AGE 18-39: CPT | Performed by: INTERNAL MEDICINE

## 2023-10-23 RX ORDER — ALBUTEROL SULFATE 90 UG/1
1 AEROSOL, METERED RESPIRATORY (INHALATION) EVERY 4 HOURS PRN
Qty: 18 G | Refills: 1 | Status: SHIPPED | OUTPATIENT
Start: 2023-10-23

## 2023-10-23 RX ORDER — MELOXICAM 7.5 MG/1
7.5 TABLET ORAL DAILY
Qty: 30 TABLET | Refills: 3 | Status: SHIPPED | OUTPATIENT
Start: 2023-10-23

## 2023-10-23 RX ORDER — METHOCARBAMOL 500 MG/1
500 TABLET, FILM COATED ORAL 4 TIMES DAILY
Qty: 40 TABLET | Refills: 0 | Status: SHIPPED | OUTPATIENT
Start: 2023-10-23 | End: 2023-11-02

## 2023-10-23 SDOH — ECONOMIC STABILITY: HOUSING INSECURITY
IN THE LAST 12 MONTHS, WAS THERE A TIME WHEN YOU DID NOT HAVE A STEADY PLACE TO SLEEP OR SLEPT IN A SHELTER (INCLUDING NOW)?: NO

## 2023-10-23 SDOH — ECONOMIC STABILITY: FOOD INSECURITY: WITHIN THE PAST 12 MONTHS, THE FOOD YOU BOUGHT JUST DIDN'T LAST AND YOU DIDN'T HAVE MONEY TO GET MORE.: NEVER TRUE

## 2023-10-23 SDOH — ECONOMIC STABILITY: FOOD INSECURITY: WITHIN THE PAST 12 MONTHS, YOU WORRIED THAT YOUR FOOD WOULD RUN OUT BEFORE YOU GOT MONEY TO BUY MORE.: NEVER TRUE

## 2023-10-23 SDOH — ECONOMIC STABILITY: INCOME INSECURITY: HOW HARD IS IT FOR YOU TO PAY FOR THE VERY BASICS LIKE FOOD, HOUSING, MEDICAL CARE, AND HEATING?: NOT HARD AT ALL

## 2023-10-23 ASSESSMENT — PATIENT HEALTH QUESTIONNAIRE - PHQ9
SUM OF ALL RESPONSES TO PHQ9 QUESTIONS 1 & 2: 0
1. LITTLE INTEREST OR PLEASURE IN DOING THINGS: 0
SUM OF ALL RESPONSES TO PHQ QUESTIONS 1-9: 0
2. FEELING DOWN, DEPRESSED OR HOPELESS: 0
SUM OF ALL RESPONSES TO PHQ QUESTIONS 1-9: 0

## 2023-10-23 ASSESSMENT — ANXIETY QUESTIONNAIRES
7. FEELING AFRAID AS IF SOMETHING AWFUL MIGHT HAPPEN: 0
6. BECOMING EASILY ANNOYED OR IRRITABLE: 0
4. TROUBLE RELAXING: 0
IF YOU CHECKED OFF ANY PROBLEMS ON THIS QUESTIONNAIRE, HOW DIFFICULT HAVE THESE PROBLEMS MADE IT FOR YOU TO DO YOUR WORK, TAKE CARE OF THINGS AT HOME, OR GET ALONG WITH OTHER PEOPLE: NOT DIFFICULT AT ALL
5. BEING SO RESTLESS THAT IT IS HARD TO SIT STILL: 0
1. FEELING NERVOUS, ANXIOUS, OR ON EDGE: 0
3. WORRYING TOO MUCH ABOUT DIFFERENT THINGS: 0
2. NOT BEING ABLE TO STOP OR CONTROL WORRYING: 0
GAD7 TOTAL SCORE: 0

## 2023-10-23 NOTE — TELEPHONE ENCOUNTER
Patient came by the office, she also would like to know if you can prescribe something for her allergies,  eyes and itchy watery, stinging   sometimes her sinus does not feel normal    She forgot to mention the allergy issue to you today      Can you send in prescription for Claritin,  she does not have to pay if you send in rx    [facility]      Patient's phone  [phone]    She also forgot to mention wanting her iron checked,    her mother had low iron and patient has had iron prescription given to her before    she just wants to make sure her iron is ok

## 2023-10-23 NOTE — TELEPHONE ENCOUNTER
Pt wants iron test added to her labs   She is at the [employer] [facility] lab - states they did what was ordered but she talked to Dr Kevin Feliz about   Also, she said they tate extra blood so if you put in an order, they should be able to do it         Also, she wants a referral for allergy testing         Best number to reach her is [phone]

## 2023-10-26 ENCOUNTER — TELEPHONE (OUTPATIENT)
Age: 28
End: 2023-10-26

## 2023-10-27 RX ORDER — LORATADINE PSEUDOEPHEDRINE SULFATE 10; 240 MG/1; MG/1
1 TABLET, EXTENDED RELEASE ORAL DAILY
Qty: 90 TABLET | Refills: 1 | Status: SHIPPED | OUTPATIENT
Start: 2023-10-27

## 2023-11-10 ENCOUNTER — HOSPITAL ENCOUNTER (OUTPATIENT)
Facility: HOSPITAL | Age: 28
Setting detail: RECURRING SERIES
Discharge: HOME | End: 2023-11-13
Payer: MEDICAID

## 2023-11-10 PROCEDURE — 97140 MANUAL THERAPY 1/> REGIONS: CPT | Performed by: PHYSICAL THERAPIST

## 2023-11-10 PROCEDURE — 97162 PT EVAL MOD COMPLEX 30 MIN: CPT | Performed by: PHYSICAL THERAPIST

## 2023-11-10 NOTE — THERAPY EVALUATION
function. Frequency / Duration: Patient to be seen 2 times per week for 16-20 treatments. Patient/ Caregiver education and instruction: Diagnosis, prognosis, self care, activity modification, and exercises   [x]  Plan of care has been reviewed with PTA Lorelei Severs, PT, DPT       11/10/2023       6:56 AM    ===================================================================  I certify that the above Therapy Services are being furnished while the patient is under my care. I agree with the treatment plan and certify that this therapy is necessary. Physician's Signature:_________________________   DATE:_________   TIME:________                           Twila Alston MD    ** Signature, Date and Time must be completed for valid certification **  Please sign and fax to [phone].   Thank you

## 2023-11-15 ENCOUNTER — OFFICE VISIT (OUTPATIENT)
Age: 28
End: 2023-11-15
Payer: MEDICAID

## 2023-11-15 VITALS
DIASTOLIC BLOOD PRESSURE: 60 MMHG | HEART RATE: 66 BPM | HEIGHT: 63 IN | TEMPERATURE: 97.7 F | OXYGEN SATURATION: 98 % | RESPIRATION RATE: 20 BRPM | SYSTOLIC BLOOD PRESSURE: 99 MMHG | WEIGHT: 141 LBS | BODY MASS INDEX: 24.98 KG/M2

## 2023-11-15 DIAGNOSIS — J30.89 ALLERGIC RHINITIS DUE TO OTHER ALLERGIC TRIGGER, UNSPECIFIED SEASONALITY: ICD-10-CM

## 2023-11-15 DIAGNOSIS — E55.9 VITAMIN D DEFICIENCY, UNSPECIFIED: Primary | ICD-10-CM

## 2023-11-15 PROCEDURE — 99213 OFFICE O/P EST LOW 20 MIN: CPT | Performed by: INTERNAL MEDICINE

## 2023-11-15 RX ORDER — LORATADINE 10 MG/1
10 TABLET ORAL DAILY
Qty: 90 TABLET | Refills: 1 | Status: SHIPPED | OUTPATIENT
Start: 2023-11-15

## 2023-11-16 ENCOUNTER — HOSPITAL ENCOUNTER (OUTPATIENT)
Facility: HOSPITAL | Age: 28
Setting detail: RECURRING SERIES
End: 2023-11-16
Payer: MEDICAID

## 2023-11-29 ENCOUNTER — HOSPITAL ENCOUNTER (OUTPATIENT)
Facility: HOSPITAL | Age: 28
Setting detail: RECURRING SERIES
Discharge: HOME | End: 2023-12-02
Payer: MEDICAID

## 2023-11-29 PROCEDURE — 97140 MANUAL THERAPY 1/> REGIONS: CPT | Performed by: PHYSICAL THERAPIST

## 2023-11-29 PROCEDURE — 97110 THERAPEUTIC EXERCISES: CPT | Performed by: PHYSICAL THERAPIST

## 2023-11-29 NOTE — PROGRESS NOTES
PHYSICAL THERAPY - DAILY TREATMENT NOTE (updated 3/23)    Date: 2023        Patient Name:  Monika Omer :  1995   Medical   Diagnosis:  Strain of unspecified muscle, fascia and tendon at shoulder and upper arm level, left arm, subsequent encounter [Q98.329A] Treatment Diagnosis:  M25.561  RIGHT KNEE PAIN , M25.512  LEFT SHOULDER PAIN , and M54.2  NECK PAIN    Referral Source:  Kamila Zamarripa MD Insurance:   Payor: [address] [place] [address] Road / Plan: [address] [place] [address] / Product Type: *No Product type* /                   Patient  verified yes     Visit #   Current  / Total 2 20   Time   In / Out 1444 1555   Total Treatment Time 71   Total Timed Codes 71     SUBJECTIVE    Pain Level (0-10 scale): 4 in neck/L shoulder    Any medication changes, allergies to medications, adverse drug reactions, diagnosis change, or new procedure performed?: [x] No    [] Yes (see summary sheet for update)  Medications: Verified on Patient Summary List    Subjective functional status/changes: The patient reports she felt \"so good\" for several days after initial evaluation; she notes she continues to feel increased neck/L shoulder discomfort with 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 She notes she danced last weekend and her R knee \"went out\" three times over that span; her knee was swollen for about a day or two afterward.     OBJECTIVE    Palpation: Tender to palpation at R > L patellar tendon/infrapatellar fat pad    R Knee AROM 5-0-143 PROM 5-0-150  L Knee AROM 7-0-143 PROM 8-0-155    Joint Mobility Assessment: Noted hypermobility to R > L patellar mobilizations (all planes)    Flexibility: Bilateral LEs WNL, all planes    LOWER QUARTER   MUSCLE STRENGTH  KEY       R  L  0 - No Contraction  Knee ext  4+  4+  1 - Trace            flex  4 (R knee p!)   5  2 - Poor   Hip ext   4  4  3 - Fair          flex   4+  4+ (R trunk lean)  4 - Good

## 2023-12-01 ENCOUNTER — HOSPITAL ENCOUNTER (OUTPATIENT)
Facility: HOSPITAL | Age: 28
Setting detail: RECURRING SERIES
Discharge: HOME | End: 2023-12-04
Payer: MEDICAID

## 2023-12-01 ENCOUNTER — APPOINTMENT (OUTPATIENT)
Facility: HOSPITAL | Age: 28
End: 2023-12-01
Payer: MEDICAID

## 2023-12-01 PROCEDURE — 97140 MANUAL THERAPY 1/> REGIONS: CPT | Performed by: PHYSICAL THERAPIST

## 2023-12-01 PROCEDURE — 97110 THERAPEUTIC EXERCISES: CPT | Performed by: PHYSICAL THERAPIST

## 2023-12-01 NOTE — PROGRESS NOTES
PHYSICAL THERAPY - DAILY TREATMENT NOTE (updated 3/23)    Date: 2023        Patient Name:  Fela Lynch :  1995   Medical   Diagnosis:  Strain of unspecified muscle, fascia and tendon at shoulder and upper arm level, left arm, subsequent encounter [S46.372X] Treatment Diagnosis:  M25.561  RIGHT KNEE PAIN , M25.512  LEFT SHOULDER PAIN , and M54.2  NECK PAIN    Referral Source:  Anna Alvarado MD Insurance:   Payor: [address] [place] [address] Road / Plan: [address] [place] [address] / Product Type: *No Product type* /                   Patient  verified yes     Visit #   Current  / Total 3 20   Time   In / Out 0810 0905   Total Treatment Time 55   Total Timed Codes 55     *Patient arrived 8 minutes late to therapy visit at this date*    SUBJECTIVE    Pain Level (0-10 scale): 5-6 in neck/L shoulder    Any medication changes, allergies to medications, adverse drug reactions, diagnosis change, or new procedure performed?: [x] No    [] Yes (see summary sheet for update)  Medications: Verified on Patient Summary List    Subjective functional status/changes: The patient reports she feels she aggravated her neck symptoms while working on her home exercises last night; she is unsure whether this was due to chin tucks or cervical rotations. She notes she felt good after last visit. She has had no issues related to R knee since last visit. OBJECTIVE    Therapeutic Procedures: Tx Min Billable or 1:1 Min (if diff from Tx Min) Procedure, Rationale, Specifics   40  77658 Therapeutic Exercise (timed):  increase ROM, strength, coordination, balance, and proprioception to improve patient's ability to progress to PLOF and address remaining functional goals.  (see flow sheet as applicable)     Details if applicable:    15  55462 Manual Therapy (timed):  decrease pain, increase ROM, increase tissue extensibility, decrease edema, decrease trigger points, and increase postural awareness to improve 98.5

## 2023-12-04 ENCOUNTER — NURSE TRIAGE (OUTPATIENT)
Dept: OTHER | Facility: CLINIC | Age: 28
End: 2023-12-04

## 2023-12-04 ENCOUNTER — HOSPITAL ENCOUNTER (EMERGENCY)
Facility: HOSPITAL | Age: 28
Discharge: HOME | End: 2023-12-04
Payer: MEDICAID

## 2023-12-04 VITALS
BODY MASS INDEX: 23.86 KG/M2 | SYSTOLIC BLOOD PRESSURE: 102 MMHG | HEIGHT: 64 IN | TEMPERATURE: 98.6 F | DIASTOLIC BLOOD PRESSURE: 79 MMHG | RESPIRATION RATE: 14 BRPM | OXYGEN SATURATION: 100 % | HEART RATE: 61 BPM | WEIGHT: 139.77 LBS

## 2023-12-04 DIAGNOSIS — K13.70 ORAL LESION: Primary | ICD-10-CM

## 2023-12-04 PROCEDURE — 99283 EMERGENCY DEPT VISIT LOW MDM: CPT

## 2023-12-04 RX ORDER — AMOXICILLIN 500 MG/1
500 CAPSULE ORAL 3 TIMES DAILY
Qty: 30 CAPSULE | Refills: 0 | Status: SHIPPED | OUTPATIENT
Start: 2023-12-04 | End: 2023-12-14

## 2023-12-04 ASSESSMENT — PAIN SCALES - GENERAL: PAINLEVEL_OUTOF10: 7

## 2023-12-04 NOTE — TELEPHONE ENCOUNTER
[facility]    Received call from [facility] at [employer] with [employer]. Subjective: Caller states \"Pt currently in the er for swelling and discoloration underneath her tongue. \"     Current Symptoms: swelling underneath tongue    Recommended disposition: Duplicate encounter    Care advice provided, patient verbalizes understanding; denies any other questions or concerns; instructed to call back for any new or worsening symptoms. Patient/Caller agrees with recommended disposition; writer provided warm transfer to [facility] at [employer] for appointment scheduling    Attention Provider: Thank you for allowing me to participate in the care of your patient. The patient was connected to triage in response to information provided to the [facility]. Please do not respond through this encounter as the response is not directed to a shared pool. Reason for Disposition   Caller has already spoken with the PCP and has no further questions.     Protocols used: No Contact or Duplicate Contact Call-ADULT-

## 2023-12-04 NOTE — TELEPHONE ENCOUNTER
Received call from [facility] at [facility] with [employer]. Subjective: Caller states \"I have swelling and discoloration under my tongue\"     Recommended disposition:  No contact call    Care advice provided, patient verbalizes understanding; denies any other questions or concerns; instructed to call back for any new or worsening symptoms. After [facility], [facility], disconnected from the line, caller was disconnected as well. Triage RN tried to call patient back and went to an identified . From encounters in patient chart, she looks to be in [facility] now with arrival time of 1614. LM to call back if still needs assistance. Attention Provider: Thank you for allowing me to participate in the care of your patient. The patient was connected to triage in response to information provided to the E[facility]. Please do not respond through this encounter as the response is not directed to a shared pool.     Reason for Disposition   Message left on identified voice mail    Protocols used: No Contact or Duplicate Contact Call-ADULT-

## 2023-12-05 ENCOUNTER — TELEPHONE (OUTPATIENT)
Age: 28
End: 2023-12-05

## 2023-12-05 NOTE — ED PROVIDER NOTES
[facility] EMERGENCY DEPT  EMERGENCY DEPARTMENT ENCOUNTER       Pt Name: Bimal Ji  MRN: [id number]  [address] 1995  Date of Evaluation: 12/4/2023  Provider: Tatianna Masters PA-C   PCP: Talia Hauser MD  Note Started: 7:41 PM 12/4/23     CHIEF COMPLAINT       Chief Complaint   Patient presents with    Skin Problem     Pt has a redness that is tender up underneath the tongue on left side for a week or more        HISTORY OF PRESENT ILLNESS: 1 or more elements      History From: Patient  None     Bimal Ji is a 29 y.o. female who presents to the ED today with concerns about a red area underneath of the left side of her tongue. This has been present before in the past.  She states usually last for about 24 hours. Thought this may be because she is allergic to elliptical loss. She has not had the area for about a week. She states that it is tender to touch. Comes here for further evaluation     Nursing Notes were all reviewed and agreed with or any disagreements were addressed in the HPI. REVIEW OF SYSTEMS      Review of Systems     Positives and Pertinent negatives as per HPI. PAST HISTORY     Past Medical History:  Past Medical History:   Diagnosis Date    Asthma     UTI (urinary tract infection)     Vitamin D deficiency        Past Surgical History:  No past surgical history on file. Family History:  No family history on file.     Social History:  Social History     Tobacco Use    Smoking status: Never    Smokeless tobacco: Never   Substance Use Topics    Alcohol use: Yes    Drug use: Never       Allergies:  No Known Allergies    CURRENT MEDICATIONS      Previous Medications    ALBUTEROL SULFATE HFA (PROVENTIL;VENTOLIN;PROAIR) 108 (90 BASE) MCG/ACT INHALER    Inhale 1 puff into the lungs every 4 hours as needed for Wheezing    CHOLECALCIFEROL (VITAMIN D3) 125 MCG (5000 UT) CAPS    Take 1 capsule by mouth daily    LORATADINE (CLARITIN) 10 MG TABLET    Take 1 tablet by mouth daily

## 2023-12-05 NOTE — TELEPHONE ENCOUNTER
Adalgisa Langford [facility] 203  Staff  Subject: Appointment Request    Reason for Call: Established Patient Appointment needed: Routine ED Follow  Up Visit    QUESTIONS    Reason for appointment request? No appointments available during search     Additional Information for Provider? Pt currently at [facility] ED for swelling  under her tongue. She wanted to schedule a f/u appt. Nothing is showing  available this week. Please contact pt to schedule.  ---------------------------------------------------------------------------  --------------  CALL BACK INFO  [phone]; OK to leave message on [employer]

## 2023-12-08 ENCOUNTER — APPOINTMENT (OUTPATIENT)
Facility: HOSPITAL | Age: 28
End: 2023-12-08
Payer: MEDICAID

## 2023-12-12 ENCOUNTER — APPOINTMENT (OUTPATIENT)
Facility: HOSPITAL | Age: 28
End: 2023-12-12
Payer: MEDICAID

## 2023-12-13 ENCOUNTER — HOSPITAL ENCOUNTER (OUTPATIENT)
Facility: HOSPITAL | Age: 28
Setting detail: RECURRING SERIES
End: 2023-12-13
Payer: MEDICAID

## 2023-12-15 ENCOUNTER — APPOINTMENT (OUTPATIENT)
Facility: HOSPITAL | Age: 28
End: 2023-12-15
Payer: MEDICAID

## 2023-12-18 ENCOUNTER — APPOINTMENT (OUTPATIENT)
Facility: HOSPITAL | Age: 28
End: 2023-12-18
Payer: MEDICAID

## 2023-12-19 ENCOUNTER — APPOINTMENT (OUTPATIENT)
Facility: HOSPITAL | Age: 28
End: 2023-12-19
Payer: MEDICAID

## 2023-12-22 ENCOUNTER — APPOINTMENT (OUTPATIENT)
Facility: HOSPITAL | Age: 28
End: 2023-12-22
Payer: MEDICAID

## 2023-12-27 ENCOUNTER — APPOINTMENT (OUTPATIENT)
Facility: HOSPITAL | Age: 28
End: 2023-12-27
Payer: MEDICAID

## 2023-12-29 ENCOUNTER — TELEPHONE (OUTPATIENT)
Age: 28
End: 2023-12-29

## 2023-12-29 ENCOUNTER — APPOINTMENT (OUTPATIENT)
Facility: HOSPITAL | Age: 28
End: 2023-12-29
Payer: MEDICAID

## 2023-12-29 NOTE — TELEPHONE ENCOUNTER
Patient came in to office for Referral for Physical Theraphy and Dermatology . Pt of left shoulder, left side of neck and right knee Left side of upper back. Please call at [phone].

## 2024-01-02 DIAGNOSIS — M54.2 NECK PAIN ON LEFT SIDE: ICD-10-CM

## 2024-01-02 DIAGNOSIS — M54.50 LEFT-SIDED LOW BACK PAIN WITHOUT SCIATICA, UNSPECIFIED CHRONICITY: ICD-10-CM

## 2024-01-02 DIAGNOSIS — M25.512 LEFT SHOULDER PAIN, UNSPECIFIED CHRONICITY: Primary | ICD-10-CM

## 2024-04-01 ENCOUNTER — HOSPITAL ENCOUNTER (OUTPATIENT)
Facility: HOSPITAL | Age: 29
Setting detail: RECURRING SERIES
Discharge: HOME | End: 2024-04-04
Payer: MEDICAID

## 2024-04-01 PROCEDURE — 97110 THERAPEUTIC EXERCISES: CPT | Performed by: PHYSICAL THERAPIST

## 2024-04-01 PROCEDURE — 97161 PT EVAL LOW COMPLEX 20 MIN: CPT | Performed by: PHYSICAL THERAPIST

## 2024-04-01 PROCEDURE — 97140 MANUAL THERAPY 1/> REGIONS: CPT | Performed by: PHYSICAL THERAPIST

## 2024-04-01 NOTE — THERAPY EVALUATION
patient will demonstrate multiplanar bilateral LE strength increased by >= 1/2 MMT grade toward improved endurance with functional activities such as walking, squatting, and dancing.              The patient will demonstrate ability to complete >= 25 R single limb heel raises toward improved stability and endurance with walking, squatting, and dancing.              The patient will score >= 78 on shoulder and >= 72 on knee FOTOs to demonstrate significantly improved subjective report of function.    Frequency / Duration: Patient to be seen 1 time per week for 12-16 treatments.    Patient/ Caregiver education and instruction: Diagnosis, prognosis, self care, activity modification, and exercises   [x]  Plan of care has been reviewed with VIOLETTE MARQUEZ PT, DPT       4/1/2024       8:55 AM    ===================================================================  I certify that the above Therapy Services are being furnished while the patient is under my care. I agree with the treatment plan and certify that this therapy is necessary.    Physician's Signature:_________________________   DATE:_________   TIME:________                           Martin Crocker MD    ** Signature, Date and Time must be completed for valid certification **  Please sign and fax to [phone].  Thank you

## 2024-04-18 ENCOUNTER — HOSPITAL ENCOUNTER (OUTPATIENT)
Facility: HOSPITAL | Age: 29
Setting detail: RECURRING SERIES
Discharge: HOME | End: 2024-04-21
Payer: MEDICAID

## 2024-04-18 PROCEDURE — 97110 THERAPEUTIC EXERCISES: CPT

## 2024-04-18 NOTE — PROGRESS NOTES
PHYSICAL THERAPY - DAILY TREATMENT NOTE (updated 3/23)      Date: 2024          Patient Name:  Mary Yusuf :  1995   Medical   Diagnosis:  Left shoulder pain [M25.512]  Neck pain on left side [M54.2]  Right knee pain [M25.561]  Left-sided low back pain without sciatica, unspecified chronicity [M54.50] Treatment Diagnosis:  M25.561  RIGHT KNEE PAIN  and M25.512  LEFT SHOULDER PAIN    Referral Source:  Martin Crocker MD Insurance:   Payor: [facility] / Plan: [employer] MEDICAID [employer] [place] / Product Type: *No Product type* /                     Patient  verified yes     Visit #   Current  / Total 2 16   Time   In / Out 9:10 AM 9:50 AM   Total Treatment Time 40 minutes   Total Timed Codes 40 minutes         SUBJECTIVE    Pain Level (0-10 scale): 4/10    Any medication changes, allergies to medications, adverse drug reactions, diagnosis change, or new procedure performed?: [x] No    [] Yes (see summary sheet for update)  Medications: Verified on Patient Summary List    Subjective functional status/changes:     The Pt reports that she was able to do the exercises at home and it made her R knee a little more achy, but then it felt like it was getting better and stronger.  She continues to complains of medial knee pain, but it is not as intense as it was.    OBJECTIVE      Therapeutic Procedures:  Tx Min Billable or 1:1 Min (if diff from Tx Min) Procedure, Rationale, Specifics   40  81037 Therapeutic Exercise (timed):  increase ROM, strength, coordination, balance, and proprioception to improve patient's ability to progress to PLOF and address remaining functional goals. (see flow sheet as applicable)     Details if applicable:                         40     Total Total         [x]  Patient Education billed concurrently with other procedures   [x] Review HEP    [] Progressed/Changed HEP, detail:    [] Other detail:         Other Objective/Functional Measures  None

## 2024-04-22 ENCOUNTER — HOSPITAL ENCOUNTER (OUTPATIENT)
Facility: HOSPITAL | Age: 29
Discharge: HOME | End: 2024-04-25
Payer: MEDICAID

## 2024-04-22 ENCOUNTER — OFFICE VISIT (OUTPATIENT)
Age: 29
End: 2024-04-22
Payer: MEDICAID

## 2024-04-22 VITALS
HEIGHT: 63 IN | OXYGEN SATURATION: 99 % | TEMPERATURE: 97.5 F | HEART RATE: 73 BPM | SYSTOLIC BLOOD PRESSURE: 106 MMHG | DIASTOLIC BLOOD PRESSURE: 68 MMHG | BODY MASS INDEX: 25.52 KG/M2 | WEIGHT: 144 LBS | RESPIRATION RATE: 18 BRPM

## 2024-04-22 DIAGNOSIS — S46.912D SHOULDER STRAIN, LEFT, SUBSEQUENT ENCOUNTER: ICD-10-CM

## 2024-04-22 DIAGNOSIS — S16.1XXD NECK STRAIN, SUBSEQUENT ENCOUNTER: ICD-10-CM

## 2024-04-22 DIAGNOSIS — G89.29 CHRONIC PAIN OF RIGHT KNEE: ICD-10-CM

## 2024-04-22 DIAGNOSIS — E55.9 VITAMIN D DEFICIENCY, UNSPECIFIED: ICD-10-CM

## 2024-04-22 DIAGNOSIS — J45.20 MILD INTERMITTENT ASTHMA WITHOUT COMPLICATION: Primary | ICD-10-CM

## 2024-04-22 DIAGNOSIS — L30.9 ECZEMA, UNSPECIFIED TYPE: ICD-10-CM

## 2024-04-22 DIAGNOSIS — M23.51 CHRONIC INSTABILITY OF KNEE, RIGHT KNEE: ICD-10-CM

## 2024-04-22 DIAGNOSIS — M25.561 CHRONIC PAIN OF RIGHT KNEE: ICD-10-CM

## 2024-04-22 LAB
25(OH)D3 SERPL-MCNC: 38.4 NG/ML (ref 30–100)
ALBUMIN SERPL-MCNC: 3.6 G/DL (ref 3.5–5)
ALBUMIN/GLOB SERPL: 0.9 (ref 1.1–2.2)
ALP SERPL-CCNC: 61 U/L (ref 45–117)
ALT SERPL-CCNC: 18 U/L (ref 12–78)
ANION GAP SERPL CALC-SCNC: 2 MMOL/L (ref 5–15)
AST SERPL-CCNC: 16 U/L (ref 15–37)
BILIRUB SERPL-MCNC: 0.5 MG/DL (ref 0.2–1)
BUN SERPL-MCNC: 12 MG/DL (ref 6–20)
BUN/CREAT SERPL: 15 (ref 12–20)
CALCIUM SERPL-MCNC: 9.4 MG/DL (ref 8.5–10.1)
CHLORIDE SERPL-SCNC: 107 MMOL/L (ref 97–108)
CO2 SERPL-SCNC: 28 MMOL/L (ref 21–32)
CREAT SERPL-MCNC: 0.82 MG/DL (ref 0.55–1.02)
GLOBULIN SER CALC-MCNC: 3.8 G/DL (ref 2–4)
GLUCOSE SERPL-MCNC: 88 MG/DL (ref 65–100)
POTASSIUM SERPL-SCNC: 4.3 MMOL/L (ref 3.5–5.1)
PROT SERPL-MCNC: 7.4 G/DL (ref 6.4–8.2)
SODIUM SERPL-SCNC: 137 MMOL/L (ref 136–145)

## 2024-04-22 PROCEDURE — 73560 X-RAY EXAM OF KNEE 1 OR 2: CPT

## 2024-04-22 PROCEDURE — 99214 OFFICE O/P EST MOD 30 MIN: CPT | Performed by: INTERNAL MEDICINE

## 2024-04-22 RX ORDER — MELOXICAM 7.5 MG/1
7.5 TABLET ORAL DAILY
Qty: 30 TABLET | Refills: 3 | Status: SHIPPED | OUTPATIENT
Start: 2024-04-22

## 2024-04-22 RX ORDER — ALBUTEROL SULFATE 90 UG/1
1 AEROSOL, METERED RESPIRATORY (INHALATION) EVERY 4 HOURS PRN
Qty: 18 G | Refills: 1 | Status: SHIPPED | OUTPATIENT
Start: 2024-04-22

## 2024-04-22 RX ORDER — LORATADINE PSEUDOEPHEDRINE SULFATE 10; 240 MG/1; MG/1
1 TABLET, EXTENDED RELEASE ORAL DAILY
Qty: 90 TABLET | Refills: 1 | Status: SHIPPED | OUTPATIENT
Start: 2024-04-22

## 2024-04-22 ASSESSMENT — PATIENT HEALTH QUESTIONNAIRE - PHQ9
SUM OF ALL RESPONSES TO PHQ9 QUESTIONS 1 & 2: 0
SUM OF ALL RESPONSES TO PHQ QUESTIONS 1-9: 0
SUM OF ALL RESPONSES TO PHQ QUESTIONS 1-9: 0
2. FEELING DOWN, DEPRESSED OR HOPELESS: NOT AT ALL
SUM OF ALL RESPONSES TO PHQ QUESTIONS 1-9: 0
1. LITTLE INTEREST OR PLEASURE IN DOING THINGS: NOT AT ALL
SUM OF ALL RESPONSES TO PHQ QUESTIONS 1-9: 0

## 2024-04-22 NOTE — PROGRESS NOTES
Mary Yusuf is a 28 y.o. female presenting for Follow-up (Follow up per Vitamin D levels and requesting xray on her knee ), Referral - General (Dermatology referral ), and Medication Refill      /68 (Site: Right Upper Arm, Position: Sitting, Cuff Size: Large Adult)   Pulse 73   Temp 97.5 °F (36.4 °C) (Temporal)   Resp 18   Ht 1.6 m (5' 3\")   Wt 65.3 kg (144 lb)   LMP 04/05/2024   SpO2 99%   BMI 25.51 kg/m²       Current Outpatient Medications   Medication Sig Dispense Refill    benzocaine (ORAJEL) 20 % GEL mucosal gel Take 1 Application by mouth 2 times daily as needed for Pain 1 each 0    Cholecalciferol (VITAMIN D3) 125 MCG (5000 UT) CAPS Take 1 capsule by mouth daily 90 capsule 1    loratadine-pseudoephedrine (CLARITIN-D 24 HOUR)  MG per extended release tablet Take 1 tablet by mouth daily 90 tablet 1    meloxicam (MOBIC) 7.5 MG tablet Take 1 tablet by mouth daily 30 tablet 3    albuterol sulfate HFA (PROVENTIL;VENTOLIN;PROAIR) 108 (90 Base) MCG/ACT inhaler Inhale 1 puff into the lungs every 4 hours as needed for Wheezing 18 g 1     No current facility-administered medications for this visit.        1. \"Have you been to the ER, urgent care clinic since your last visit?  Hospitalized since your last visit?\" No    2. \"Have you seen or consulted any other health care providers outside of the [facility] since your last visit?\" No     3. For patients aged 45-75: Has the patient had a colonoscopy / FIT/ Cologuard? NA - based on age      If the patient is female:    4. For patients aged 40-74: Has the patient had a mammogram within the past 2 years? NA - based on age or sex      5. For patients aged 21-65: Has the patient had a pap smear? Yes - no Care Gap present                      
1    meloxicam (MOBIC) 7.5 MG tablet Take 1 tablet by mouth daily 30 tablet 3    benzocaine (ORAJEL) 20 % GEL mucosal gel Take 1 Application by mouth 2 times daily as needed for Pain 1 each 0     No current facility-administered medications for this visit.     No Known Allergies    Objective:  /68 (Site: Right Upper Arm, Position: Sitting, Cuff Size: Large Adult)   Pulse 73   Temp 97.5 °F (36.4 °C) (Temporal)   Resp 18   Ht 1.6 m (5' 3\")   Wt 65.3 kg (144 lb)   LMP 04/05/2024   SpO2 99%   BMI 25.51 kg/m²   Physical Exam:   General appearance - alert, well appearing in no distress  Mental status - alert, oriented to person, place, and time  EYE-PERRL, EOMI  ENT-ENT exam normal, no neck nodes or sinus tenderness  Neck - supple, no significant adenopathy   Chest - no wheezes, rales or rhonchi, symmetric air entry   Heart - normal S1, S2, no murmurs, rubs  Abdomen - soft, nontender, nondistended, no organomegaly  Ext-peripheral pulses normal, no pedal edema  Skin-rash on chest and groin  Neuro -no focal findings   Back-full range of motion, no tenderness, palpable spasm or pain on motion     Assessment/Plan:  Mary was seen today for follow-up, referral - general and medication refill.    Diagnoses and all orders for this visit:    Eczema, unspecified type  -     AFL -  Vj Verma MD, Dermatology [place] ([facility])    Vitamin D deficiency, unspecified  -     vitamin D (VITAMIN D3) 125 MCG (5000 UT) CAPS capsule; Take 1 capsule by mouth daily  -     Vitamin D 25 Hydroxy; Future    Shoulder strain, left, subsequent encounter  -     meloxicam (MOBIC) 7.5 MG tablet; Take 1 tablet by mouth daily  -     Comprehensive Metabolic Panel; Future    Neck strain, subsequent encounter  -     meloxicam (MOBIC) 7.5 MG tablet; Take 1 tablet by mouth daily  -     Comprehensive Metabolic Panel; Future    Chronic instability of knee, right knee  -     meloxicam (MOBIC) 7.5 MG tablet; Take 1 tablet by mouth daily  -

## 2024-04-26 ENCOUNTER — HOSPITAL ENCOUNTER (OUTPATIENT)
Facility: HOSPITAL | Age: 29
Setting detail: RECURRING SERIES
End: 2024-04-26
Payer: MEDICAID

## 2024-04-29 ENCOUNTER — HOSPITAL ENCOUNTER (OUTPATIENT)
Facility: HOSPITAL | Age: 29
Setting detail: RECURRING SERIES
Discharge: HOME | End: 2024-05-02
Payer: MEDICAID

## 2024-04-29 PROCEDURE — 97140 MANUAL THERAPY 1/> REGIONS: CPT

## 2024-04-29 PROCEDURE — 97110 THERAPEUTIC EXERCISES: CPT

## 2024-04-29 NOTE — PROGRESS NOTES
postural abnormalities, and instruct in home and community integration to address functional deficits and attain remaining goals.    Progress toward goals / Updated goals:  []  See Progress Note/Recertification    Short Term Goals: To be accomplished in 6-8 treatments.              The patient will demonstrate understanding of and compliance with updated and progressive HEP toward improved participation in physical therapy plan of care- progressing              The patient will demonstrate R knee flexion AROM >= 150 degrees toward improved mechanics with walking, squatting, and dancing- progressing              The patient will demonstrate ability to maintain R single limb postural control eyes closed >= 30 seconds toward improved stability with walking, squatting, and dancing- progressing  Long Term Goals: To be accomplished in 12-16 treatments.              The patient will demonstrate multiplanar bilateral LE strength increased by >= 1/2 MMT grade toward improved endurance with functional activities such as walking, squatting, and dancing- progressing              The patient will demonstrate ability to complete >= 25 R single limb heel raises toward improved stability and endurance with walking, squatting, and dancing- progressing              The patient will score >= 78 on shoulder and >= 72 on knee FOTOs to demonstrate significantly improved subjective report of function- progressing      PLAN  Yes  Continue plan of care  Re-Cert Due: NA  [x]  Upgrade activities as tolerated  []  Discharge due to:  []  Other:      Kelsie Panda, PT       4/29/2024       9:04 AM

## 2024-05-08 ENCOUNTER — HOSPITAL ENCOUNTER (OUTPATIENT)
Facility: HOSPITAL | Age: 29
Setting detail: RECURRING SERIES
End: 2024-05-08
Payer: MEDICAID

## 2024-05-10 ENCOUNTER — HOSPITAL ENCOUNTER (OUTPATIENT)
Facility: HOSPITAL | Age: 29
Setting detail: RECURRING SERIES
Discharge: HOME | End: 2024-05-13
Payer: MEDICAID

## 2024-05-10 ENCOUNTER — APPOINTMENT (OUTPATIENT)
Facility: HOSPITAL | Age: 29
End: 2024-05-10
Payer: MEDICAID

## 2024-05-10 PROCEDURE — 97110 THERAPEUTIC EXERCISES: CPT
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
## 2024-06-07 ENCOUNTER — HOSPITAL ENCOUNTER (OUTPATIENT)
Facility: HOSPITAL | Age: 29
Setting detail: RECURRING SERIES
End: 2024-06-07
Payer: MEDICAID

## 2024-06-12 ENCOUNTER — HOSPITAL ENCOUNTER (OUTPATIENT)
Facility: HOSPITAL | Age: 29
Setting detail: RECURRING SERIES
Discharge: HOME | End: 2024-06-15
Payer: MEDICAID

## 2024-06-12 PROCEDURE — 97110 THERAPEUTIC EXERCISES: CPT

## 2024-06-12 NOTE — PROGRESS NOTES
PHYSICAL THERAPY - DAILY TREATMENT NOTE (updated 3/23)      Date: 2024          Patient Name:  Mary Yusuf :  1995   Medical   Diagnosis:  Left shoulder pain [M25.512]  Neck pain on left side [M54.2]  Right knee pain [M25.561]  Left-sided low back pain without sciatica, unspecified chronicity [M54.50] Treatment Diagnosis:  M25.561  RIGHT KNEE PAIN  and M25.512  LEFT SHOULDER PAIN    Referral Source:  Martin Crocker MD Insurance:   Payor: [facility] / Plan: [employer] MEDICAID [employer]AN [place] / Product Type: *No Product type* /                     Patient  verified yes     Visit #   Current  / Total 5 16   Time   In / Out 6:16 PM 7:02  PM   Total Treatment Time 46  minutes   Total Timed Codes 46  minutes     *Shortened appointment this visit due to patient arriving 16 minutes late to appointment due to getting stuck in traffic*    SUBJECTIVE    Pain Level (0-10 scale): 4-5/10 shoulder 3/10 knee    Any medication changes, allergies to medications, adverse drug reactions, diagnosis change, or new procedure performed?: [x] No    [] Yes (see summary sheet for update)  Medications: Verified on Patient Summary List    Subjective functional status/changes:     The Pt reports she has been very busy and very stressed with work recently. Patient reports pain has been off/on over past month. Admits she has not been performing exercises very frequently like she should, but does note a difference in pain when she does do them consistently. 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      OBJECTIVE    Therapeutic Procedures:  Tx Min Billable or 1:1 Min (if diff from Tx Min) Procedure, Rationale, Specifics   46  22433 Therapeutic Exercise (timed):  increase ROM, strength, coordination, balance, and proprioception to improve patient's

## 2024-06-14 ENCOUNTER — APPOINTMENT (OUTPATIENT)
Facility: HOSPITAL | Age: 29
End: 2024-06-14
Payer: MEDICAID

## 2024-06-14 ENCOUNTER — HOSPITAL ENCOUNTER (OUTPATIENT)
Facility: HOSPITAL | Age: 29
Setting detail: RECURRING SERIES
Discharge: HOME | End: 2024-06-17
Payer: MEDICAID

## 2024-06-14 PROCEDURE — 97110 THERAPEUTIC EXERCISES: CPT

## 2024-06-14 NOTE — PROGRESS NOTES
PHYSICAL THERAPY - DAILY TREATMENT NOTE (updated 3/23)      Date: 2024          Patient Name:  Mary Yusuf :  1995   Medical   Diagnosis:  Left shoulder pain [M25.512]  Neck pain on left side [M54.2]  Right knee pain [M25.561]  Left-sided low back pain without sciatica, unspecified chronicity [M54.50] Treatment Diagnosis:  M25.561  RIGHT KNEE PAIN  and M25.512  LEFT SHOULDER PAIN    Referral Source:  Martin Crocker MD Insurance:   Payor: [facility] / Plan: [employer] MEDICAID [employer] [place] / Product Type: *No Product type* /                     Patient  verified yes     Visit #   Current  / Total 6 16   Time   In / Out 8:48 AM 9:15  PM   Total Treatment Time 27 minutes   Total Timed Codes 27 minutes     *Shortened session due to patient arriving 18 minutes late to appointment this visit*    SUBJECTIVE    Pain Level (0-10 scale): 2/10 shoulder 2-3/10 knee    Any medication changes, allergies to medications, adverse drug reactions, diagnosis change, or new procedure performed?: [x] No    [] Yes (see summary sheet for update)  Medications: Verified on Patient Summary List    Subjective functional status/changes:     The Pt reports her pain has been improved since last visit, but left side of neck is bothering her more than shoulder or knee right now. Notes she did try some exercises yesterday.    OBJECTIVE    Therapeutic Procedures:  Tx Min Billable or 1:1 Min (if diff from Tx Min) Procedure, Rationale, Specifics   27  34197 Therapeutic Exercise (timed):  increase ROM, strength, coordination, balance, and proprioception to improve patient's ability to progress to PLOF and address remaining functional goals. (see flow sheet as applicable)     Details if applicable:     nt  43281 Manual Therapy (timed):  decrease pain, increase ROM, increase tissue extensibility, decrease trigger points, and increase postural awareness to improve patient's ability to progress to PLOF and

## 2024-06-17 ENCOUNTER — APPOINTMENT (OUTPATIENT)
Facility: HOSPITAL | Age: 29
End: 2024-06-17
Payer: MEDICAID

## 2024-06-18 ENCOUNTER — APPOINTMENT (OUTPATIENT)
Facility: HOSPITAL | Age: 29
End: 2024-06-18
Payer: MEDICAID

## 2024-06-19 ENCOUNTER — APPOINTMENT (OUTPATIENT)
Facility: HOSPITAL | Age: 29
End: 2024-06-19
Payer: MEDICAID

## 2024-06-19 ENCOUNTER — HOSPITAL ENCOUNTER (OUTPATIENT)
Facility: HOSPITAL | Age: 29
Setting detail: RECURRING SERIES
Discharge: HOME | End: 2024-06-22
Payer: MEDICAID

## 2024-06-19 PROCEDURE — 97110 THERAPEUTIC EXERCISES: CPT

## 2024-06-19 NOTE — THERAPY DISCHARGE
[facility]  [address]  [place] [address]  Phone: [phone]   Fax: [phone]     DISCHARGE SUMMARY  Patient Name: Mary Yusuf : 1995   Treatment/Medical Diagnosis: Left shoulder pain [M25.512]  Neck pain on left side [M54.2]  Right knee pain [M25.561]  Left-sided low back pain without sciatica, unspecified chronicity [M54.50]   Referral Source: Martin Crocker MD     Date of Initial Visit: 2024 Attended Visits: 7 Missed Visits: 5     SUMMARY OF TREATMENT  Patient is a 28 year old female who was seen for 7 visits since initial evaluation on  2024  due to L neck and shoulder pian and R knee pain. Patient progress has been limited due to inconsistent attendance,  shortened PT visits due to frequent late arrival to appointments, and poor compliance to HEP. Patient was previously evaluated on 11/10/2023 for similar diagnosis but was discharged on 2023 due to 4 no-shows to PT appointments as well. Patient does respond well to exercises when performing them in clinic, noting decrease in familiar pain post-treatments but unable to obtain long term relief due to inconsistent attendance and compliance to HEP between PT visits. Extensive time has been spent at each PT visit discussing with patient the importance of consistency in order to maximize benefit of physical therapy. Patient has long commute to clinic from work and from her house making it difficult to make it to PT appointments. Patient is also about to do several weeks of summer traveling. Discussed transition to independent management of sx via home exercise program until patient schedule clears up and allows patient better able to make PT appointments. Also discussed patient transitioning care to another [facility] clinic that is more convenient location and closer to work/home. Patient verbalizes understanding and is in agreement

## 2024-06-19 NOTE — PROGRESS NOTES
PHYSICAL THERAPY - DAILY TREATMENT NOTE (updated 3/23)      Date: 2024          Patient Name:  Mary Yusuf :  1995   Medical   Diagnosis:  Left shoulder pain [M25.512]  Neck pain on left side [M54.2]  Right knee pain [M25.561]  Left-sided low back pain without sciatica, unspecified chronicity [M54.50] Treatment Diagnosis:  M25.561  RIGHT KNEE PAIN  and M25.512  LEFT SHOULDER PAIN    Referral Source:  Martin Crocker MD Insurance:   Payor: [facility] / Plan: [employer] MEDICAID [employer] [place] / Product Type: *No Product type* /                     Patient  verified yes     Visit #   Current  / Total 7 16   Time   In / Out 6:13 PM 6:55  PM   Total Treatment Time 42 minutes   Total Timed Codes 42 minutes     *Shortened session due to patient arriving 13 minutes late to appointment this visit*    SUBJECTIVE    Pain Level (0-10 scale): 2/10 shoulder 2/10 knee    Any medication changes, allergies to medications, adverse drug reactions, diagnosis change, or new procedure performed?: [x] No    [] Yes (see summary sheet for update)  Medications: Verified on Patient Summary List    Subjective functional status/changes:     The Pt reports she has tried to do some of the exercises since her last appointment and notes it helped when she performed them at work when she began to notice increasing pain from prolonged sitting all day. Reports her neck has been still bothering her more than shoulder and knee. Patient does feel like she has improved since initial visit, but knows she needs to be more consistent with exercises in order to continue to make progress.    OBJECTIVE    Therapeutic Procedures:  Tx Min Billable or 1:1 Min (if diff from Tx Min) Procedure, Rationale, Specifics   42  16483 Therapeutic Exercise (timed):  increase ROM, strength, coordination, balance, and proprioception to improve patient's ability to progress to PLOF and address remaining functional goals. (see flow

## 2024-06-21 ENCOUNTER — APPOINTMENT (OUTPATIENT)
Facility: HOSPITAL | Age: 29
End: 2024-06-21
Payer: MEDICAID

## 2024-06-26 ENCOUNTER — APPOINTMENT (OUTPATIENT)
Facility: HOSPITAL | Age: 29
End: 2024-06-26
Payer: MEDICAID

## 2024-06-28 ENCOUNTER — APPOINTMENT (OUTPATIENT)
Facility: HOSPITAL | Age: 29
End: 2024-06-28
Payer: MEDICAID

## 2024-08-26 ENCOUNTER — OFFICE VISIT (OUTPATIENT)
Age: 29
End: 2024-08-26
Payer: MEDICAID

## 2024-08-26 VITALS
SYSTOLIC BLOOD PRESSURE: 100 MMHG | WEIGHT: 137.2 LBS | DIASTOLIC BLOOD PRESSURE: 61 MMHG | RESPIRATION RATE: 16 BRPM | TEMPERATURE: 96.1 F | HEIGHT: 63 IN | HEART RATE: 66 BPM | OXYGEN SATURATION: 99 % | BODY MASS INDEX: 24.31 KG/M2

## 2024-08-26 DIAGNOSIS — E55.9 VITAMIN D DEFICIENCY: ICD-10-CM

## 2024-08-26 DIAGNOSIS — E03.9 HYPOTHYROIDISM, UNSPECIFIED TYPE: ICD-10-CM

## 2024-08-26 DIAGNOSIS — E78.5 DYSLIPIDEMIA (HIGH LDL; LOW HDL): ICD-10-CM

## 2024-08-26 DIAGNOSIS — Z00.00 ENCOUNTER FOR ANNUAL PHYSICAL EXAM: ICD-10-CM

## 2024-08-26 DIAGNOSIS — R73.03 BORDERLINE DIABETES MELLITUS: ICD-10-CM

## 2024-08-26 DIAGNOSIS — Z11.4 ENCOUNTER FOR SCREENING FOR HUMAN IMMUNODEFICIENCY VIRUS (HIV): ICD-10-CM

## 2024-08-26 DIAGNOSIS — N30.00 ACUTE CYSTITIS WITHOUT HEMATURIA: ICD-10-CM

## 2024-08-26 DIAGNOSIS — Z12.4 PAP SMEAR FOR CERVICAL CANCER SCREENING: ICD-10-CM

## 2024-08-26 DIAGNOSIS — J45.20 MILD INTERMITTENT ASTHMA WITHOUT COMPLICATION: ICD-10-CM

## 2024-08-26 DIAGNOSIS — Z11.59 NEED FOR HEPATITIS C SCREENING TEST: ICD-10-CM

## 2024-08-26 DIAGNOSIS — L30.9 DERMATITIS: ICD-10-CM

## 2024-08-26 DIAGNOSIS — E55.9 VITAMIN D DEFICIENCY, UNSPECIFIED: ICD-10-CM

## 2024-08-26 DIAGNOSIS — Z00.00 ENCOUNTER FOR ANNUAL PHYSICAL EXAM: Primary | ICD-10-CM

## 2024-08-26 LAB
25(OH)D3 SERPL-MCNC: 33.3 NG/ML (ref 30–100)
ALBUMIN SERPL-MCNC: 3.6 G/DL (ref 3.5–5)
ALBUMIN/GLOB SERPL: 0.9 (ref 1.1–2.2)
ALP SERPL-CCNC: 63 U/L (ref 45–117)
ALT SERPL-CCNC: 14 U/L (ref 12–78)
AMORPH CRY URNS QL MICRO: ABNORMAL
ANION GAP SERPL CALC-SCNC: 4 MMOL/L (ref 5–15)
APPEARANCE UR: ABNORMAL
AST SERPL-CCNC: 19 U/L (ref 15–37)
BACTERIA URNS QL MICRO: ABNORMAL /HPF
BASOPHILS # BLD: 0 K/UL (ref 0–0.1)
BASOPHILS NFR BLD: 1 % (ref 0–1)
BILIRUB SERPL-MCNC: 0.8 MG/DL (ref 0.2–1)
BILIRUB UR QL: NEGATIVE
BUN SERPL-MCNC: 12 MG/DL (ref 6–20)
BUN/CREAT SERPL: 13 (ref 12–20)
CALCIUM SERPL-MCNC: 9.2 MG/DL (ref 8.5–10.1)
CHLORIDE SERPL-SCNC: 105 MMOL/L (ref 97–108)
CHOLEST SERPL-MCNC: 167 MG/DL
CO2 SERPL-SCNC: 29 MMOL/L (ref 21–32)
COLOR UR: ABNORMAL
CREAT SERPL-MCNC: 0.92 MG/DL (ref 0.55–1.02)
DIFFERENTIAL METHOD BLD: NORMAL
EOSINOPHIL # BLD: 0.1 K/UL (ref 0–0.4)
EOSINOPHIL NFR BLD: 2 % (ref 0–7)
EPITH CASTS URNS QL MICRO: ABNORMAL /LPF
ERYTHROCYTE [DISTWIDTH] IN BLOOD BY AUTOMATED COUNT: 13.1 % (ref 11.5–14.5)
EST. AVERAGE GLUCOSE BLD GHB EST-MCNC: 103 MG/DL
GLOBULIN SER CALC-MCNC: 4.2 G/DL (ref 2–4)
GLUCOSE SERPL-MCNC: 89 MG/DL (ref 65–100)
GLUCOSE UR STRIP.AUTO-MCNC: NEGATIVE MG/DL
HBA1C MFR BLD: 5.2 % (ref 4–5.6)
HCT VFR BLD AUTO: 38.3 % (ref 35–47)
HCV AB SER IA-ACNC: 0.11 INDEX
HCV AB SERPL QL IA: NONREACTIVE
HDLC SERPL-MCNC: 65 MG/DL
HDLC SERPL: 2.6 (ref 0–5)
HGB BLD-MCNC: 12.2 G/DL (ref 11.5–16)
HGB UR QL STRIP: NEGATIVE
HIV 1+2 AB+HIV1 P24 AG SERPL QL IA: NONREACTIVE
HIV 1/2 RESULT COMMENT: NORMAL
IMM GRANULOCYTES # BLD AUTO: 0 K/UL (ref 0–0.04)
IMM GRANULOCYTES NFR BLD AUTO: 0 % (ref 0–0.5)
KETONES UR QL STRIP.AUTO: ABNORMAL MG/DL
LDLC SERPL CALC-MCNC: 85 MG/DL (ref 0–100)
LEUKOCYTE ESTERASE UR QL STRIP.AUTO: ABNORMAL
LYMPHOCYTES # BLD: 1.9 K/UL (ref 0.8–3.5)
LYMPHOCYTES NFR BLD: 42 % (ref 12–49)
MCH RBC QN AUTO: 28.9 PG (ref 26–34)
MCHC RBC AUTO-ENTMCNC: 31.9 G/DL (ref 30–36.5)
MCV RBC AUTO: 90.8 FL (ref 80–99)
MONOCYTES # BLD: 0.4 K/UL (ref 0–1)
MONOCYTES NFR BLD: 8 % (ref 5–13)
NEUTS SEG # BLD: 2.1 K/UL (ref 1.8–8)
NEUTS SEG NFR BLD: 47 % (ref 32–75)
NITRITE UR QL STRIP.AUTO: NEGATIVE
NRBC # BLD: 0 K/UL (ref 0–0.01)
NRBC BLD-RTO: 0 PER 100 WBC
PH UR STRIP: 5.5 (ref 5–8)
PLATELET # BLD AUTO: 239 K/UL (ref 150–400)
PMV BLD AUTO: 11.6 FL (ref 8.9–12.9)
POTASSIUM SERPL-SCNC: 4.2 MMOL/L (ref 3.5–5.1)
PROT SERPL-MCNC: 7.8 G/DL (ref 6.4–8.2)
PROT UR STRIP-MCNC: ABNORMAL MG/DL
RBC # BLD AUTO: 4.22 M/UL (ref 3.8–5.2)
RBC #/AREA URNS HPF: ABNORMAL /HPF (ref 0–5)
SODIUM SERPL-SCNC: 138 MMOL/L (ref 136–145)
SP GR UR REFRACTOMETRY: >1.03 (ref 1–1.03)
TRIGL SERPL-MCNC: 85 MG/DL
TSH SERPL DL<=0.05 MIU/L-ACNC: 1.54 UIU/ML (ref 0.36–3.74)
URINE CULTURE IF INDICATED: ABNORMAL
UROBILINOGEN UR QL STRIP.AUTO: 0.2 EU/DL (ref 0.2–1)
VLDLC SERPL CALC-MCNC: 17 MG/DL
WBC # BLD AUTO: 4.5 K/UL (ref 3.6–11)
WBC URNS QL MICRO: ABNORMAL /HPF (ref 0–4)

## 2024-08-26 PROCEDURE — 99214 OFFICE O/P EST MOD 30 MIN: CPT | Performed by: INTERNAL MEDICINE

## 2024-08-26 RX ORDER — HYDROCORTISONE VALERATE CREAM 2 MG/G
CREAM TOPICAL
Qty: 15 G | Refills: 1 | Status: SHIPPED | OUTPATIENT
Start: 2024-08-26

## 2024-08-26 ASSESSMENT — PATIENT HEALTH QUESTIONNAIRE - PHQ9
SUM OF ALL RESPONSES TO PHQ QUESTIONS 1-9: 0
2. FEELING DOWN, DEPRESSED OR HOPELESS: NOT AT ALL
SUM OF ALL RESPONSES TO PHQ QUESTIONS 1-9: 0
1. LITTLE INTEREST OR PLEASURE IN DOING THINGS: NOT AT ALL
SUM OF ALL RESPONSES TO PHQ9 QUESTIONS 1 & 2: 0

## 2024-08-26 NOTE — PROGRESS NOTES
Chief Complaint   Patient presents with    Follow-up     HPI:  Mary Yusuf is a 29 y.o. female with significant medical history below presents for annual physical..  Patient is doing well. Blood pressure is at goal. She has no complaints.    Review of Systems  As per hpi    Past Medical History:   Diagnosis Date    Asthma     UTI (urinary tract infection)     Vitamin D deficiency      History reviewed. No pertinent surgical history.  Social History     Socioeconomic History    Marital status: Single     Spouse name: None    Number of children: None    Years of education: None    Highest education level: None   Tobacco Use    Smoking status: Never    Smokeless tobacco: Never   Substance and Sexual Activity    Alcohol use: Yes    Drug use: Never     Social Determinants of Health     Financial Resource Strain: Low Risk  (10/23/2023)    Overall Financial Resource Strain (CARDIA)     Difficulty of Paying Living Expenses: Not hard at all   Transportation Needs: Unknown (10/23/2023)    PRAPARE - Transportation     Lack of Transportation (Non-Medical): No   Housing Stability: Unknown (10/23/2023)    Housing Stability Vital Sign     Unstable Housing in the Last Year: No     History reviewed. No pertinent family history.  Current Outpatient Medications   Medication Sig Dispense Refill    hydrocortisone (WESTCORT) 0.2 % cream Apply topically 2 times daily. 15 g 1    albuterol sulfate HFA (PROVENTIL;VENTOLIN;PROAIR) 108 (90 Base) MCG/ACT inhaler Inhale 1 puff into the lungs every 4 hours as needed for Wheezing 18 g 1    vitamin D (VITAMIN D3) 125 MCG (5000 UT) CAPS capsule Take 1 capsule by mouth daily 90 capsule 1    loratadine-pseudoephedrine (CLARITIN-D 24 HOUR)  MG per extended release tablet Take 1 tablet by mouth daily 90 tablet 1    meloxicam (MOBIC) 7.5 MG tablet Take 1 tablet by mouth daily 30 tablet 3    benzocaine (ORAJEL) 20 % GEL mucosal gel Take 1 Application by mouth 2 times daily as needed for Pain 1 each

## 2024-08-26 NOTE — PROGRESS NOTES
Chief Complaint   Patient presents with    Follow-up       \"Have you been to the ER, urgent care clinic since your last visit?  Hospitalized since your last visit?\"    NO    “Have you seen or consulted any other health care providers outside of [facility] since your last visit?”    NO     “Have you had a pap smear?”    NO    Date of last Cervical Cancer screen (HPV or PAP): 2020             Click Here for Release of Records Request       There were no vitals filed for this visit.   Health Maintenance Due   Topic Date Due    Varicella vaccine (1 of 2 - 13+ 2-dose series) Never done    Hepatitis B vaccine (1 of 3 - 19+ 3-dose series) Never done    DTaP/Tdap/Td vaccine (1 - Tdap) Never done    COVID-19 Vaccine ( - - season) Never done    Pap smear  2023    Flu vaccine (1) Never done        The patient, Mary Yusuf, identity was verified by name and .

## 2024-09-11 ENCOUNTER — TELEMEDICINE (OUTPATIENT)
Age: 29
End: 2024-09-11
Payer: MEDICAID

## 2024-09-11 DIAGNOSIS — J30.89 ALLERGIC RHINITIS DUE TO OTHER ALLERGIC TRIGGER, UNSPECIFIED SEASONALITY: ICD-10-CM

## 2024-09-11 DIAGNOSIS — J45.20 MILD INTERMITTENT ASTHMA WITHOUT COMPLICATION: Primary | ICD-10-CM

## 2024-09-11 DIAGNOSIS — N30.00 ACUTE CYSTITIS WITHOUT HEMATURIA: ICD-10-CM

## 2024-09-11 DIAGNOSIS — L30.9 DERMATITIS: ICD-10-CM

## 2024-09-11 DIAGNOSIS — E55.9 VITAMIN D DEFICIENCY, UNSPECIFIED: ICD-10-CM

## 2024-09-11 PROCEDURE — 99214 OFFICE O/P EST MOD 30 MIN: CPT | Performed by: INTERNAL MEDICINE

## 2024-09-11 RX ORDER — CIPROFLOXACIN 500 MG/1
500 TABLET, FILM COATED ORAL 2 TIMES DAILY
Qty: 10 TABLET | Refills: 0 | Status: SHIPPED | OUTPATIENT
Start: 2024-09-11 | End: 2024-09-16

## 2024-09-11 RX ORDER — HYDROQUINONE 40 MG/G
CREAM TOPICAL
Qty: 28.35 G | Refills: 2 | Status: SHIPPED | OUTPATIENT
Start: 2024-09-11

## 2024-09-11 RX ORDER — LORATADINE 10 MG/1
10 TABLET ORAL DAILY
Qty: 90 TABLET | Refills: 1 | Status: SHIPPED | OUTPATIENT
Start: 2024-09-11

## 2024-09-11 RX ORDER — ALBUTEROL SULFATE 90 UG/1
1 AEROSOL, METERED RESPIRATORY (INHALATION) EVERY 4 HOURS PRN
Qty: 18 G | Refills: 1 | Status: SHIPPED | OUTPATIENT
Start: 2024-09-11

## 2024-09-12 ENCOUNTER — TELEPHONE (OUTPATIENT)
Age: 29
End: 2024-09-12

## 2024-09-12 DIAGNOSIS — L30.9 DERMATITIS: ICD-10-CM

## 2025-06-03 ENCOUNTER — PATIENT MESSAGE (OUTPATIENT)
Age: 30
End: 2025-06-03

## 2025-06-03 ENCOUNTER — OFFICE VISIT (OUTPATIENT)
Age: 30
End: 2025-06-03

## 2025-06-03 VITALS
BODY MASS INDEX: 24.98 KG/M2 | DIASTOLIC BLOOD PRESSURE: 90 MMHG | OXYGEN SATURATION: 98 % | RESPIRATION RATE: 18 BRPM | TEMPERATURE: 98.5 F | SYSTOLIC BLOOD PRESSURE: 127 MMHG | HEART RATE: 83 BPM | WEIGHT: 141 LBS

## 2025-06-03 DIAGNOSIS — N89.8 VAGINAL DISCHARGE: Primary | ICD-10-CM

## 2025-06-03 RX ORDER — FLUCONAZOLE 150 MG/1
150 TABLET ORAL
Qty: 2 TABLET | Refills: 0 | Status: SHIPPED | OUTPATIENT
Start: 2025-06-03 | End: 2025-06-09

## 2025-06-03 RX ORDER — METRONIDAZOLE 500 MG/1
500 TABLET ORAL 2 TIMES DAILY
Qty: 14 TABLET | Refills: 0 | Status: SHIPPED | OUTPATIENT
Start: 2025-06-03 | End: 2025-06-10

## 2025-06-03 RX ORDER — METRONIDAZOLE 500 MG/1
500 TABLET ORAL 2 TIMES DAILY
Qty: 14 TABLET | Refills: 0 | Status: SHIPPED | OUTPATIENT
Start: 2025-06-03 | End: 2025-06-03

## 2025-06-03 RX ORDER — FLUCONAZOLE 150 MG/1
150 TABLET ORAL
Qty: 2 TABLET | Refills: 0 | Status: SHIPPED | OUTPATIENT
Start: 2025-06-03 | End: 2025-06-03

## 2025-06-03 NOTE — PROGRESS NOTES
Patient Name: Mary Yusuf   YOB: 1995   Patient Status: New patient,   Chief Complaint: Yeast Infection (Pt presents for vaginal itching. Sx 1 week )      ____________________________________________________________________________________________    ASSESSMENT/PLAN:    Clinical Impression:   Assessment & Plan  Vaginal discharge       Orders:    Nuswab Vaginitis Plus (VG+); Future    fluconazole (DIFLUCAN) 150 MG tablet; Take 1 tablet by mouth every 72 hours for 6 days    metroNIDAZOLE (FLAGYL) 500 MG tablet; Take 1 tablet by mouth 2 times daily for 7 days         MDM: This patient presents with vaginal discharge with fishy smell, vaginal itching and a history consistent with BV and yeast infection. No red flag history or findings for UTI / pyelonephritis. Will treat for yeast with Fluconazole and BV with Flagyl.  Someone will call with positive results and/or any change in treatment based on lab findings usually in 3-5 business days, strongly encouragde patient to watch MyChart and call if  any questions regarding lab results. Patient was well appearing and afebrile today.  ER precautions given.  Patient verbalized understanding and agreement of precautions and care plan.           External Records Reviewed: None    Limitation to History: None    Outside Historian: None    SUBJECTIVE/OBJECTIVE:  Mary Yusuf is a 29 y.o. female presents with complaint of white vaginal discharge with fishy odor, vaginal itching and irritation.  Symptoms began 1 week(s) ago and show no change since onset. She reports unprotected sex recently.  The patient denies fever, abdominal pain, urinary or GI symptoms, flank pain, genital sores or rash. She reports unprotected sex and would like to be checked for STI.  No other acute symptoms reported at this time.          PAST MEDICAL HISTORY:   Medical: Pt  has a past medical history of Asthma, UTI (urinary tract infection), and Vitamin D deficiency.  Surgical: Pt  has no past

## 2025-06-04 NOTE — PATIENT INSTRUCTIONS
Read printed education and follow recommendations.  Someone will call with positive results and/or any change in treatment based on lab findings usually in 3-5 business days, strongly encourage you to watch MyChart and call if you have any questions regarding lab results. Follow closely with GYN and/or PCP.  Go to ER if fever > 100.4, severe abdominal pain or any concerning symptoms

## 2025-06-06 LAB
A VAGINAE DNA VAG QL NAA+PROBE: ABNORMAL SCORE
BVAB2 DNA VAG QL NAA+PROBE: ABNORMAL SCORE
C ALBICANS DNA VAG QL NAA+PROBE: POSITIVE
C GLABRATA DNA VAG QL NAA+PROBE: NEGATIVE
MEGA1 DNA VAG QL NAA+PROBE: ABNORMAL SCORE
SPECIMEN SOURCE: ABNORMAL

## 2025-06-09 NOTE — TELEPHONE ENCOUNTER
Requested Prescriptions     Pending Prescriptions Disp Refills    lisinopril (PRINIVIL;ZESTRIL) 5 MG tablet 180 tablet 3     Sig: Take 1 tablet by mouth daily    carvedilol (COREG) 12.5 MG tablet 180 tablet 3     Sig: Take 1 tablet by mouth 2 times daily (with meals) with meals     Verified rx in last OV date 1/22/25. Pharmacy confirmed. Erx as requested.     Spoke with patient. She wanted results from toe x-ray. Informed her letter sent with results. Informed her what letter said. Referral info given.

## 2025-06-21 ENCOUNTER — RESULTS FOLLOW-UP (OUTPATIENT)
Age: 30
End: 2025-06-21

## 2025-06-21 NOTE — RESULT ENCOUNTER NOTE
Date: 2025    LVM: No    Spoke to patient or guardian: Yes, two identifiers verified    Outcome: Results provided per note from provider    Follow up needed: None at this time.     Notes  Spoke to patient who verified her name and  and provided lab results. Patient verbalized understanding and is happy with outcome. Advised her if symptoms do not get better to follow up with GYN or PCP. No additional questions at this time.

## 2025-07-23 ENCOUNTER — OFFICE VISIT (OUTPATIENT)
Age: 30
End: 2025-07-23
Payer: MEDICAID

## 2025-07-23 VITALS
BODY MASS INDEX: 24.95 KG/M2 | OXYGEN SATURATION: 93 % | RESPIRATION RATE: 16 BRPM | SYSTOLIC BLOOD PRESSURE: 97 MMHG | HEART RATE: 61 BPM | HEIGHT: 63 IN | TEMPERATURE: 97.7 F | WEIGHT: 140.8 LBS | DIASTOLIC BLOOD PRESSURE: 55 MMHG

## 2025-07-23 DIAGNOSIS — Z00.00 ENCOUNTER FOR ANNUAL PHYSICAL EXAM: Primary | ICD-10-CM

## 2025-07-23 DIAGNOSIS — Z13.29 SCREENING FOR THYROID DISORDER: ICD-10-CM

## 2025-07-23 DIAGNOSIS — N30.00 ACUTE CYSTITIS WITHOUT HEMATURIA: ICD-10-CM

## 2025-07-23 DIAGNOSIS — E78.5 DYSLIPIDEMIA (HIGH LDL; LOW HDL): ICD-10-CM

## 2025-07-23 DIAGNOSIS — L30.9 ECZEMA, UNSPECIFIED TYPE: ICD-10-CM

## 2025-07-23 DIAGNOSIS — Z13.1 SCREENING FOR DIABETES MELLITUS (DM): ICD-10-CM

## 2025-07-23 DIAGNOSIS — E55.9 VITAMIN D DEFICIENCY: ICD-10-CM

## 2025-07-23 DIAGNOSIS — Z00.00 ENCOUNTER FOR ANNUAL PHYSICAL EXAM: ICD-10-CM

## 2025-07-23 PROCEDURE — 99385 PREV VISIT NEW AGE 18-39: CPT | Performed by: INTERNAL MEDICINE

## 2025-07-23 SDOH — ECONOMIC STABILITY: FOOD INSECURITY: WITHIN THE PAST 12 MONTHS, THE FOOD YOU BOUGHT JUST DIDN'T LAST AND YOU DIDN'T HAVE MONEY TO GET MORE.: NEVER TRUE

## 2025-07-23 SDOH — ECONOMIC STABILITY: FOOD INSECURITY: WITHIN THE PAST 12 MONTHS, YOU WORRIED THAT YOUR FOOD WOULD RUN OUT BEFORE YOU GOT MONEY TO BUY MORE.: NEVER TRUE

## 2025-07-23 ASSESSMENT — PATIENT HEALTH QUESTIONNAIRE - PHQ9
1. LITTLE INTEREST OR PLEASURE IN DOING THINGS: NOT AT ALL
SUM OF ALL RESPONSES TO PHQ QUESTIONS 1-9: 0
SUM OF ALL RESPONSES TO PHQ QUESTIONS 1-9: 0
2. FEELING DOWN, DEPRESSED OR HOPELESS: NOT AT ALL
SUM OF ALL RESPONSES TO PHQ QUESTIONS 1-9: 0
SUM OF ALL RESPONSES TO PHQ QUESTIONS 1-9: 0

## 2025-07-25 LAB
25(OH)D3 SERPL-MCNC: 35.2 NG/ML (ref 30–100)
ALBUMIN SERPL-MCNC: 3.5 G/DL (ref 3.5–5)
ALBUMIN/GLOB SERPL: 0.9 (ref 1.1–2.2)
ALP SERPL-CCNC: 58 U/L (ref 45–117)
ALT SERPL-CCNC: 27 U/L (ref 12–78)
ANION GAP SERPL CALC-SCNC: 6 MMOL/L (ref 2–12)
AST SERPL-CCNC: 26 U/L (ref 15–37)
BASOPHILS # BLD: 0.05 K/UL (ref 0–0.1)
BASOPHILS NFR BLD: 0.8 % (ref 0–1)
BILIRUB SERPL-MCNC: 1.3 MG/DL (ref 0.2–1)
BUN SERPL-MCNC: 10 MG/DL (ref 6–20)
BUN/CREAT SERPL: 11 (ref 12–20)
CALCIUM SERPL-MCNC: 9 MG/DL (ref 8.5–10.1)
CHLORIDE SERPL-SCNC: 105 MMOL/L (ref 97–108)
CHOLEST SERPL-MCNC: 148 MG/DL
CO2 SERPL-SCNC: 25 MMOL/L (ref 21–32)
CREAT SERPL-MCNC: 0.92 MG/DL (ref 0.55–1.02)
DIFFERENTIAL METHOD BLD: NORMAL
EOSINOPHIL # BLD: 0.06 K/UL (ref 0–0.4)
EOSINOPHIL NFR BLD: 0.9 % (ref 0–7)
ERYTHROCYTE [DISTWIDTH] IN BLOOD BY AUTOMATED COUNT: 13.3 % (ref 11.5–14.5)
EST. AVERAGE GLUCOSE BLD GHB EST-MCNC: 94 MG/DL
GLOBULIN SER CALC-MCNC: 3.9 G/DL (ref 2–4)
GLUCOSE SERPL-MCNC: 92 MG/DL (ref 65–100)
HBA1C MFR BLD: 4.9 % (ref 4–5.6)
HCT VFR BLD AUTO: 41.4 % (ref 35–47)
HDLC SERPL-MCNC: 76 MG/DL
HDLC SERPL: 1.9 (ref 0–5)
HGB BLD-MCNC: 12.5 G/DL (ref 11.5–16)
IMM GRANULOCYTES # BLD AUTO: 0.02 K/UL (ref 0–0.04)
IMM GRANULOCYTES NFR BLD AUTO: 0.3 % (ref 0–0.5)
LDLC SERPL CALC-MCNC: 58.6 MG/DL (ref 0–100)
LYMPHOCYTES # BLD: 2.23 K/UL (ref 0.8–3.5)
LYMPHOCYTES NFR BLD: 34.8 % (ref 12–49)
MCH RBC QN AUTO: 28.3 PG (ref 26–34)
MCHC RBC AUTO-ENTMCNC: 30.2 G/DL (ref 30–36.5)
MCV RBC AUTO: 93.7 FL (ref 80–99)
MONOCYTES # BLD: 0.41 K/UL (ref 0–1)
MONOCYTES NFR BLD: 6.4 % (ref 5–13)
NEUTS SEG # BLD: 3.64 K/UL (ref 1.8–8)
NEUTS SEG NFR BLD: 56.8 % (ref 32–75)
NRBC # BLD: 0 K/UL (ref 0–0.01)
NRBC BLD-RTO: 0 PER 100 WBC
PLATELET # BLD AUTO: 233 K/UL (ref 150–400)
PMV BLD AUTO: 11.8 FL (ref 8.9–12.9)
POTASSIUM SERPL-SCNC: 3.9 MMOL/L (ref 3.5–5.1)
PROT SERPL-MCNC: 7.4 G/DL (ref 6.4–8.2)
RBC # BLD AUTO: 4.42 M/UL (ref 3.8–5.2)
SODIUM SERPL-SCNC: 136 MMOL/L (ref 136–145)
TRIGL SERPL-MCNC: 67 MG/DL
TSH SERPL DL<=0.05 MIU/L-ACNC: 1.59 UIU/ML (ref 0.36–3.74)
VLDLC SERPL CALC-MCNC: 13.4 MG/DL
WBC # BLD AUTO: 6.4 K/UL (ref 3.6–11)

## 2025-08-05 ENCOUNTER — TELEPHONE (OUTPATIENT)
Age: 30
End: 2025-08-05